# Patient Record
Sex: FEMALE | Race: WHITE | NOT HISPANIC OR LATINO | URBAN - METROPOLITAN AREA
[De-identification: names, ages, dates, MRNs, and addresses within clinical notes are randomized per-mention and may not be internally consistent; named-entity substitution may affect disease eponyms.]

---

## 2023-07-27 ENCOUNTER — INPATIENT (INPATIENT)
Facility: HOSPITAL | Age: 83
LOS: 18 days | Discharge: NURSING, REHAB OR LONG-TERM CARE | DRG: 812 | End: 2023-08-15
Attending: HOSPITALIST | Admitting: FAMILY MEDICINE
Payer: COMMERCIAL

## 2023-07-27 VITALS
WEIGHT: 149.91 LBS | HEART RATE: 77 BPM | TEMPERATURE: 98 F | RESPIRATION RATE: 18 BRPM | HEIGHT: 63 IN | OXYGEN SATURATION: 99 % | DIASTOLIC BLOOD PRESSURE: 96 MMHG | SYSTOLIC BLOOD PRESSURE: 155 MMHG

## 2023-07-27 DIAGNOSIS — M19.90 UNSPECIFIED OSTEOARTHRITIS, UNSPECIFIED SITE: ICD-10-CM

## 2023-07-27 DIAGNOSIS — Z59.00 HOMELESSNESS UNSPECIFIED: ICD-10-CM

## 2023-07-27 DIAGNOSIS — D64.9 ANEMIA, UNSPECIFIED: ICD-10-CM

## 2023-07-27 LAB
ABO RH CONFIRMATION: SIGNIFICANT CHANGE UP
ALBUMIN SERPL ELPH-MCNC: 4.1 G/DL — SIGNIFICANT CHANGE UP (ref 3.3–5.2)
ALP SERPL-CCNC: 81 U/L — SIGNIFICANT CHANGE UP (ref 40–120)
ALT FLD-CCNC: 14 U/L — SIGNIFICANT CHANGE UP
ANION GAP SERPL CALC-SCNC: 12 MMOL/L — SIGNIFICANT CHANGE UP (ref 5–17)
ANISOCYTOSIS BLD QL: SIGNIFICANT CHANGE UP
APTT BLD: 32.2 SEC — SIGNIFICANT CHANGE UP (ref 24.5–35.6)
AST SERPL-CCNC: 20 U/L — SIGNIFICANT CHANGE UP
BASOPHILS # BLD AUTO: 0.03 K/UL — SIGNIFICANT CHANGE UP (ref 0–0.2)
BASOPHILS NFR BLD AUTO: 0.9 % — SIGNIFICANT CHANGE UP (ref 0–2)
BILIRUB SERPL-MCNC: 0.5 MG/DL — SIGNIFICANT CHANGE UP (ref 0.4–2)
BLD GP AB SCN SERPL QL: SIGNIFICANT CHANGE UP
BUN SERPL-MCNC: 17.8 MG/DL — SIGNIFICANT CHANGE UP (ref 8–20)
CALCIUM SERPL-MCNC: 8.9 MG/DL — SIGNIFICANT CHANGE UP (ref 8.4–10.5)
CHLORIDE SERPL-SCNC: 103 MMOL/L — SIGNIFICANT CHANGE UP (ref 96–108)
CO2 SERPL-SCNC: 22 MMOL/L — SIGNIFICANT CHANGE UP (ref 22–29)
CREAT SERPL-MCNC: 0.6 MG/DL — SIGNIFICANT CHANGE UP (ref 0.5–1.3)
DACRYOCYTES BLD QL SMEAR: SLIGHT — SIGNIFICANT CHANGE UP
EGFR: 89 ML/MIN/1.73M2 — SIGNIFICANT CHANGE UP
EOSINOPHIL # BLD AUTO: 0 K/UL — SIGNIFICANT CHANGE UP (ref 0–0.5)
EOSINOPHIL NFR BLD AUTO: 0 % — SIGNIFICANT CHANGE UP (ref 0–6)
FERRITIN SERPL-MCNC: 19 NG/ML — SIGNIFICANT CHANGE UP (ref 13–330)
GIANT PLATELETS BLD QL SMEAR: PRESENT — SIGNIFICANT CHANGE UP
GLUCOSE SERPL-MCNC: 108 MG/DL — HIGH (ref 70–99)
HCT VFR BLD CALC: 25.2 % — LOW (ref 34.5–45)
HGB BLD-MCNC: 6.9 G/DL — CRITICAL LOW (ref 11.5–15.5)
HYPOCHROMIA BLD QL: SIGNIFICANT CHANGE UP
INR BLD: 1.12 RATIO — SIGNIFICANT CHANGE UP (ref 0.85–1.18)
IRON SATN MFR SERPL: 11 UG/DL — LOW (ref 37–145)
IRON SATN MFR SERPL: 2 % — LOW (ref 14–50)
LYMPHOCYTES # BLD AUTO: 0.67 K/UL — LOW (ref 1–3.3)
LYMPHOCYTES # BLD AUTO: 18.6 % — SIGNIFICANT CHANGE UP (ref 13–44)
MANUAL SMEAR VERIFICATION: SIGNIFICANT CHANGE UP
MCHC RBC-ENTMCNC: 18 PG — LOW (ref 27–34)
MCHC RBC-ENTMCNC: 27.4 GM/DL — LOW (ref 32–36)
MCV RBC AUTO: 65.6 FL — LOW (ref 80–100)
MICROCYTES BLD QL: SIGNIFICANT CHANGE UP
MONOCYTES # BLD AUTO: 0.64 K/UL — SIGNIFICANT CHANGE UP (ref 0–0.9)
MONOCYTES NFR BLD AUTO: 17.7 % — HIGH (ref 2–14)
NEUTROPHILS # BLD AUTO: 2.25 K/UL — SIGNIFICANT CHANGE UP (ref 1.8–7.4)
NEUTROPHILS NFR BLD AUTO: 62.8 % — SIGNIFICANT CHANGE UP (ref 43–77)
PLAT MORPH BLD: NORMAL — SIGNIFICANT CHANGE UP
PLATELET # BLD AUTO: 162 K/UL — SIGNIFICANT CHANGE UP (ref 150–400)
POIKILOCYTOSIS BLD QL AUTO: SLIGHT — SIGNIFICANT CHANGE UP
POLYCHROMASIA BLD QL SMEAR: SLIGHT — SIGNIFICANT CHANGE UP
POTASSIUM SERPL-MCNC: 4 MMOL/L — SIGNIFICANT CHANGE UP (ref 3.5–5.3)
POTASSIUM SERPL-SCNC: 4 MMOL/L — SIGNIFICANT CHANGE UP (ref 3.5–5.3)
PROT SERPL-MCNC: 6.9 G/DL — SIGNIFICANT CHANGE UP (ref 6.6–8.7)
PROTHROM AB SERPL-ACNC: 12.4 SEC — SIGNIFICANT CHANGE UP (ref 9.5–13)
RBC # BLD: 3.84 M/UL — SIGNIFICANT CHANGE UP (ref 3.8–5.2)
RBC # FLD: 19.6 % — HIGH (ref 10.3–14.5)
RBC BLD AUTO: ABNORMAL
RETICS #: 84.8 K/UL — SIGNIFICANT CHANGE UP (ref 25–125)
RETICS/RBC NFR: 2.2 % — SIGNIFICANT CHANGE UP (ref 0.5–2.5)
SARS-COV-2 RNA SPEC QL NAA+PROBE: SIGNIFICANT CHANGE UP
SCHISTOCYTES BLD QL AUTO: SLIGHT — SIGNIFICANT CHANGE UP
SMUDGE CELLS # BLD: PRESENT — SIGNIFICANT CHANGE UP
SODIUM SERPL-SCNC: 137 MMOL/L — SIGNIFICANT CHANGE UP (ref 135–145)
TIBC SERPL-MCNC: 493 UG/DL — HIGH (ref 220–430)
TRANSFERRIN SERPL-MCNC: 345 MG/DL — SIGNIFICANT CHANGE UP (ref 192–382)
WBC # BLD: 3.59 K/UL — LOW (ref 3.8–10.5)
WBC # FLD AUTO: 3.59 K/UL — LOW (ref 3.8–10.5)

## 2023-07-27 PROCEDURE — 99223 1ST HOSP IP/OBS HIGH 75: CPT

## 2023-07-27 PROCEDURE — 99285 EMERGENCY DEPT VISIT HI MDM: CPT

## 2023-07-27 PROCEDURE — 93010 ELECTROCARDIOGRAM REPORT: CPT

## 2023-07-27 RX ORDER — PANTOPRAZOLE SODIUM 20 MG/1
40 TABLET, DELAYED RELEASE ORAL ONCE
Refills: 0 | Status: COMPLETED | OUTPATIENT
Start: 2023-07-27 | End: 2023-07-27

## 2023-07-27 RX ORDER — ACETAMINOPHEN 500 MG
650 TABLET ORAL EVERY 6 HOURS
Refills: 0 | Status: DISCONTINUED | OUTPATIENT
Start: 2023-07-27 | End: 2023-08-15

## 2023-07-27 RX ORDER — PANTOPRAZOLE SODIUM 20 MG/1
40 TABLET, DELAYED RELEASE ORAL
Refills: 0 | Status: DISCONTINUED | OUTPATIENT
Start: 2023-07-27 | End: 2023-08-15

## 2023-07-27 RX ORDER — FUROSEMIDE 40 MG
20 TABLET ORAL ONCE
Refills: 0 | Status: COMPLETED | OUTPATIENT
Start: 2023-07-27 | End: 2023-07-27

## 2023-07-27 RX ADMIN — PANTOPRAZOLE SODIUM 40 MILLIGRAM(S): 20 TABLET, DELAYED RELEASE ORAL at 19:25

## 2023-07-27 SDOH — ECONOMIC STABILITY - HOUSING INSECURITY: HOMELESSNESS UNSPECIFIED: Z59.00

## 2023-07-27 NOTE — H&P ADULT - ASSESSMENT
pt. is an 83 y/o Female with history of arthritis, dementia  ? BIBEMS from department of  office for evaluation due to concerns for not being able to care for herself. Patient has been in shelter for the past 2 weeks due to homelessness after being evicted from her previous home with her son. Patient denies any complaints at this time. Denies fevers, chills, headache, chest pain, palpitations, shortness of breath, cough, abd. pain, nausea, vomiting, diarrhea, hematuria, vaginal bleed, dysuria, dark stools, bloody vomitus, focal neurologic symptoms.  In the ER pt's Hb 6.9, no old labs for comparison pt. is refusing rectal exam. ER team given one unit of

## 2023-07-27 NOTE — ED PROVIDER NOTE - CLINICAL SUMMARY MEDICAL DECISION MAKING FREE TEXT BOX
82y Female with homelessness and concerns for not being able to care for self. No signs of traumatic injuries, well appearing. Hemodynamically stable, lungs clear. 82y Female with homelessness and concerns for not being able to care for self. No signs of traumatic injuries, well appearing. Hemodynamically stable, lungs clear. Differential diagnosis includes but not limited to electrolyte derangement, anemia. SW following. 82y Female with homelessness and concerns for not being able to care for self. No signs of traumatic injuries, well appearing. Hemodynamically stable, lungs clear. Differential diagnosis includes but not limited to electrolyte derangement, anemia. SW following. Labs and imaging independently reviewed and interpreted with note of anemia, Protonix given.  Multiple attempts for occult with patient refusing. Telemetry admit.

## 2023-07-27 NOTE — PATIENT PROFILE ADULT - FALL HARM RISK - HARM RISK INTERVENTIONS

## 2023-07-27 NOTE — ED ADULT NURSE REASSESSMENT NOTE - NS ED NURSE REASSESS COMMENT FT1
Dr Hope at bedside. pt states she is refusing a blood transfusion at this time. pt states she wants to talk to her doctor that she has seen for many years and that she will talk to him tomorrow, so for tonight pt does not want a blood transfusion. pt is A&Ox2-3, she is able to state her name, birthday and that she is at a hospital but states "I don't know the name of it because I have never been here before and quite frankly I didn't know it existed." When asked what year it is the pt is unable to state the year, but knows it is summer time, that is July and that her birthday is on Saturday. pt is unable to give the name of the president, stating   "I can't pronounce his name but I know he is an idiot." per Dr Hope, he will consult psych in the morning but since the patient does not have any active bleeding at this time and the pt received protonix it is ok for pt not to have the blood transfusion tonight.

## 2023-07-27 NOTE — ED PROVIDER NOTE - ATTENDING CONTRIBUTION TO CARE
currently homeless, living in shelter, poor adherence to ADLs, sent for eval; hx of dementia; other medical history unknown; denies specific complaints; on exam, awake and alert, intermittently confused; head NCAT; normal mucosa; +pallor; normal heart and lung sounds; abd soft nt nd; no edema; decline rectal exam; anemia noted; plan for transfusion, admit;    I personally saw the patient with the resident, and completed the key components of the history and physical exam. I then discussed the management plan with the resident.

## 2023-07-27 NOTE — ED ADULT NURSE REASSESSMENT NOTE - NS ED NURSE REASSESS COMMENT FT1
pt refusing occult testing at this time. Rn spoke to diet office and teena states pt tray is coming to ED.

## 2023-07-27 NOTE — ED PROVIDER NOTE - OBJECTIVE STATEMENT
82y Female with history of arthritis, ?dementia BIBEMS from department of  office for evaluation due to concerns for not being able to care for herself. Patient has been in shelter for the past 2 weeks due to homelessness after being evicted from her previous home with her son. Patient denies any complaints at this time. Denies fevers, chills, headache, chest pain, palpitations, shortness of breath, cough, nausea, vomiting, diarrhea, hematuria, dysuria, dark stools, focal neurologic symptoms.

## 2023-07-27 NOTE — ED ADULT NURSE NOTE - OBJECTIVE STATEMENT
Assumed patient care at 17:00, no acute distress noted at this time, received patient a&ox4, respirations are even and unlabored on room air. Patient BIBA from shelter, pt states that she is hoping to be placed in an assisted living facility. Patient states she used to live at home with her adult son but lost their apartment due to financial troubles. Patient denies pain, SOB, dizziness, blood thinners. Pt states she has arthritis and uses her walker to get around. Patient updated on plan of care, awaiting social work consult. Labs drawn and sent to lab, patient awaiting food tray, safety precautions maintained.

## 2023-07-27 NOTE — H&P ADULT - PROBLEM SELECTOR PLAN 1
Hb 6.9, etiology ?   no old labs for comparison.   GI, PUD ? source. no info about If had colonoscopy in the past.   chronic anemia ? with drop in Hb  pt. refused rectal exam  will request for anemia work up  GI consult called by ER physician Hb 6.9, etiology ?   no old labs for comparison.   GI, PUD ? source. no info about If had colonoscopy in the past.   chronic anemia ? with drop in Hb  pt. refused rectal exam  ER physician ordered 1 unit of prbc but pt. is refusing to get blood, stated " I don't want it " tried to explain that 1 unit of blood will help her blood count but still does not want it. capacity ? with suspected dementia.   will request for anemia work up  GI consult called by ER physician

## 2023-07-27 NOTE — H&P ADULT - NSHPPHYSICALEXAM_GEN_ALL_CORE
Vital Signs Last 24 Hrs  T(C): 36.3 (27 Jul 2023 19:18), Max: 36.9 (27 Jul 2023 16:13)  T(F): 97.4 (27 Jul 2023 19:18), Max: 98.5 (27 Jul 2023 16:13)  HR: 85 (27 Jul 2023 19:18) (77 - 85)  BP: 143/73 (27 Jul 2023 19:18) (143/73 - 155/96)  BP(mean): --  RR: 18 (27 Jul 2023 16:13) (18 - 18)  SpO2: 98% (27 Jul 2023 19:18) (98% - 99%)    Parameters below as of 27 Jul 2023 16:13  Patient On (Oxygen Delivery Method): room air    General: pt. in bed not in distress  eyes : . PERRL. intact EOM.  HENT: AT, NC no throat erythema or exudate.   Neck: supple. no JVD.   Chest: air entry fair bilaterally  Heart: S1,S2. RRR. no heart murmur. trace edema of lower extremities.   Abdomen: soft. non-tender. non-distended. + BS.   rectal : deferred by pt.  Ext:  no calf tenderness. arthritic changes in multiple joints noted  vascular : distal pulses palpable  Neuro: Alert, awake, non focal, suspected baseline dementia, no speech disorder  Skin: warm and dry, mild skin pallor.  psych : no agitation, no si/hi

## 2023-07-27 NOTE — H&P ADULT - PROBLEM SELECTOR PLAN 3
social work consult, will need placement. social work consult, will need placement.  Psych consult requested if pt. has capacity . social work consult, will need placement.  Psych consult requested if pt. has capacity and also  will likely need psych eval for placement.

## 2023-07-27 NOTE — ED ADULT NURSE NOTE - NSFALLRISKINTERV_ED_ALL_ED

## 2023-07-27 NOTE — H&P ADULT - HISTORY OF PRESENT ILLNESS
pt. is an 83 y/o Female with history of arthritis, ?dementia BIBEMS from department of  office for evaluation due to concerns for not being able to care for herself. Patient has been in shelter for the past 2 weeks due to homelessness after being evicted from her previous home with her son. Patient denies any complaints at this time. Denies fevers, chills, headache, chest pain, palpitations, shortness of breath, cough, abd. pain, nausea, vomiting, diarrhea, hematuria, vaginal bleed, dysuria, dark stools, bloody vomitus, focal neurologic symptoms.  In the ER pt's Hb 6.9, no old labs for comparison pt. is refusing rectal exam. ER team given one unit of prbc.  pt. is an 83 y/o Female with history of arthritis, dementia  ? BIBEMS from department of  office for evaluation due to concerns for not being able to care for herself. Patient has been in shelter for the past 2 weeks due to homelessness after being evicted from her previous home with her son. Patient denies any complaints at this time. Denies fevers, chills, headache, chest pain, palpitations, shortness of breath, cough, abd. pain, nausea, vomiting, diarrhea, hematuria, vaginal bleed, dysuria, dark stools, bloody vomitus, focal neurologic symptoms.  In the ER pt's Hb 6.9, no old labs for comparison pt. is refusing rectal exam. ER team given one unit of prbc. pt. stated that she is not on any meds like motrin, advil or any other prescribed meds. i called pt's sons iglesia and luis e , numbers in our system. someone picked up from the number for iglesia but stated it was not his place. no answer from luis e phone.  pt. is an 83 y/o Female with history of arthritis, dementia  ? BIBEMS from department of  office for evaluation due to concerns for not being able to care for herself. Patient has been in shelter for the past 2 weeks due to homelessness after being evicted from her previous home with her son. Patient denies any complaints at this time. Denies fevers, chills, headache, chest pain, palpitations, shortness of breath, cough, abd. pain, nausea, vomiting, diarrhea, hematuria, vaginal bleed, dysuria, dark stools, bloody vomitus, focal neurologic symptoms. pt. stated that she is not sure if allergic to any medicines.   In the ER pt's Hb 6.9, no old labs for comparison pt. is refusing rectal exam. ER team given one unit of prbc. pt. stated that she is not on any meds like motrin, advil or any other prescribed meds. i called pt's sons iglesia and luis e , stiven in our system. someone picked up from the number for iglesia but stated it was not his place. no answer from luis e phone.

## 2023-07-27 NOTE — PATIENT PROFILE ADULT - VISION (WITH CORRECTIVE LENSES IF THE PATIENT USUALLY WEARS THEM):
Partially impaired: cannot see medication labels or newsprint, but can see obstacles in path, and the surrounding layout; can count fingers at arm's length [Negative] : Heme/Lymph [FreeTextEntry6] : COugh improved [de-identified] : HA improved

## 2023-07-27 NOTE — ED ADULT TRIAGE NOTE - CHIEF COMPLAINT QUOTE
Patient BIBA to ED today from department of  office for evaluation of placement into assistant living EMS states this for reason they were called.  Patient for about the past 2 weeks has been in shelter and is not taking care of herself for example washing herself and eating.  Patient with hx dementia according to EMS.  Patient was living with son seems for about two weeks ago and seems she was displaced from apartment due to son being hospitalized and now his whereabouts are unknown.  Patient is awake and alert, was able to provide information on herself, , year, states she is in a hospital, states she is here due to "people wanting her to take a break".

## 2023-07-28 LAB
ANION GAP SERPL CALC-SCNC: 9 MMOL/L — SIGNIFICANT CHANGE UP (ref 5–17)
BASOPHILS # BLD AUTO: 0.01 K/UL — SIGNIFICANT CHANGE UP (ref 0–0.2)
BASOPHILS NFR BLD AUTO: 0.2 % — SIGNIFICANT CHANGE UP (ref 0–2)
BUN SERPL-MCNC: 16.3 MG/DL — SIGNIFICANT CHANGE UP (ref 8–20)
CALCIUM SERPL-MCNC: 8.7 MG/DL — SIGNIFICANT CHANGE UP (ref 8.4–10.5)
CHLORIDE SERPL-SCNC: 105 MMOL/L — SIGNIFICANT CHANGE UP (ref 96–108)
CO2 SERPL-SCNC: 23 MMOL/L — SIGNIFICANT CHANGE UP (ref 22–29)
CREAT SERPL-MCNC: 0.66 MG/DL — SIGNIFICANT CHANGE UP (ref 0.5–1.3)
EGFR: 87 ML/MIN/1.73M2 — SIGNIFICANT CHANGE UP
EOSINOPHIL # BLD AUTO: 0.03 K/UL — SIGNIFICANT CHANGE UP (ref 0–0.5)
EOSINOPHIL NFR BLD AUTO: 0.7 % — SIGNIFICANT CHANGE UP (ref 0–6)
FOLATE SERPL-MCNC: 14.4 NG/ML — SIGNIFICANT CHANGE UP
GLUCOSE SERPL-MCNC: 89 MG/DL — SIGNIFICANT CHANGE UP (ref 70–99)
HCT VFR BLD CALC: 23 % — LOW (ref 34.5–45)
HGB BLD-MCNC: 6.3 G/DL — CRITICAL LOW (ref 11.5–15.5)
IMM GRANULOCYTES NFR BLD AUTO: 0.5 % — SIGNIFICANT CHANGE UP (ref 0–0.9)
LYMPHOCYTES # BLD AUTO: 0.88 K/UL — LOW (ref 1–3.3)
LYMPHOCYTES # BLD AUTO: 21.4 % — SIGNIFICANT CHANGE UP (ref 13–44)
MAGNESIUM SERPL-MCNC: 1.9 MG/DL — SIGNIFICANT CHANGE UP (ref 1.8–2.6)
MCHC RBC-ENTMCNC: 18.1 PG — LOW (ref 27–34)
MCHC RBC-ENTMCNC: 27.4 GM/DL — LOW (ref 32–36)
MCV RBC AUTO: 66.1 FL — LOW (ref 80–100)
MONOCYTES # BLD AUTO: 0.69 K/UL — SIGNIFICANT CHANGE UP (ref 0–0.9)
MONOCYTES NFR BLD AUTO: 16.7 % — HIGH (ref 2–14)
NEUTROPHILS # BLD AUTO: 2.49 K/UL — SIGNIFICANT CHANGE UP (ref 1.8–7.4)
NEUTROPHILS NFR BLD AUTO: 60.5 % — SIGNIFICANT CHANGE UP (ref 43–77)
PHOSPHATE SERPL-MCNC: 3.2 MG/DL — SIGNIFICANT CHANGE UP (ref 2.4–4.7)
PLATELET # BLD AUTO: 157 K/UL — SIGNIFICANT CHANGE UP (ref 150–400)
POTASSIUM SERPL-MCNC: 4.1 MMOL/L — SIGNIFICANT CHANGE UP (ref 3.5–5.3)
POTASSIUM SERPL-SCNC: 4.1 MMOL/L — SIGNIFICANT CHANGE UP (ref 3.5–5.3)
RBC # BLD: 3.48 M/UL — LOW (ref 3.8–5.2)
RBC # FLD: 19.6 % — HIGH (ref 10.3–14.5)
SODIUM SERPL-SCNC: 137 MMOL/L — SIGNIFICANT CHANGE UP (ref 135–145)
T3 SERPL-MCNC: 92 NG/DL — SIGNIFICANT CHANGE UP (ref 80–200)
T4 AB SER-ACNC: 5.4 UG/DL — SIGNIFICANT CHANGE UP (ref 4.5–12)
TSH SERPL-MCNC: 5.34 UIU/ML — HIGH (ref 0.27–4.2)
VIT B12 SERPL-MCNC: 530 PG/ML — SIGNIFICANT CHANGE UP (ref 232–1245)
WBC # BLD: 4.12 K/UL — SIGNIFICANT CHANGE UP (ref 3.8–10.5)
WBC # FLD AUTO: 4.12 K/UL — SIGNIFICANT CHANGE UP (ref 3.8–10.5)

## 2023-07-28 PROCEDURE — 71045 X-RAY EXAM CHEST 1 VIEW: CPT | Mod: 26

## 2023-07-28 PROCEDURE — 99233 SBSQ HOSP IP/OBS HIGH 50: CPT

## 2023-07-28 PROCEDURE — 99221 1ST HOSP IP/OBS SF/LOW 40: CPT

## 2023-07-28 PROCEDURE — 99223 1ST HOSP IP/OBS HIGH 75: CPT

## 2023-07-28 RX ORDER — ALPRAZOLAM 0.25 MG
1 TABLET ORAL ONCE
Refills: 0 | Status: DISCONTINUED | OUTPATIENT
Start: 2023-07-28 | End: 2023-07-28

## 2023-07-28 NOTE — BH CONSULTATION LIAISON ASSESSMENT NOTE - SUMMARY
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 While in the hospital, patient found with significant anemia requiring transfusion but currently refusing. psychiatry consulted to evaluate patients capacity to refuse.     Patient seen and evaluated and found to be a poor historian with poor attention and memory. No known formal diagnosis of dementia but history of forgetfulness as per son (mild cognitive impairment?). In regards to patients capacity, patient is able to express a choice but currently lacks appreciation, and reasoning related to her current medical condition(anemia) which limits her capacity to refuse blood transfusion     Dx.   Delirium  r/o dementia     Recs   -Maintain delirium precautions   -Avoid anticholinergic agents, benzos, opioid as they can further perpetuate confusion   -Frequent re orientation, Hydration, try to avoid restraints and if possible, mobilize patient and PT involvement   -no capacity to refuse blood transfusion, recommend to include family in care.

## 2023-07-28 NOTE — BH CONSULTATION LIAISON ASSESSMENT NOTE - HPI (INCLUDE ILLNESS QUALITY, SEVERITY, DURATION, TIMING, CONTEXT, MODIFYING FACTORS, ASSOCIATED SIGNS AND SYMPTOMS)
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 While in the hospital, patient found with significant anemia requiring transfusion but currently refusing. psychiatry consulted to evaluate patients capacity to refuse.     Patient seen and evaluated and found to be calm, cooperative and pleasant but a poor historian while oriented to person, partially to place, partially to time and not to situation. Patient stating she was told to come to "this place" stating she heard we "can help" but could not elaborate. Patient does admit to being seen by a doctor this morning and at first states she was told that "everything is fine" but when probed, she admits she was told she had "anemia". Patient stating she has always had anemia and used to get "shots as a kid for it". Patient cued by writer on need for transfusion and patient declining stating "I just don't want any surgeries now". Writer attempted to clarify but patient declining stating she will be "fine". Patient does report to having some memory problems but denies any history of dementia. she states she was living with her son Reji who lost his job and as a result the lost there apartment. she state she has been staying in different shelters/ hotels but could not elaborate and could not related where her son Reji is now or her other 2 sons (has 3 adult sons total). She denies any depression or anxiety, reports good appetite and sleep, with no s/h ideation, no symptoms of ho or AVH.     Discussed with SW who spoke to patients one Matthew who is living in a shelter himself and denies patient having a diagnoses of dementia but can be "forgetful".     Writer attempted to contact patients sons for collateral but no answer     [place] [phone]  [place]ong [phone]  Reji [phone]

## 2023-07-28 NOTE — PROVIDER CONTACT NOTE (CRITICAL VALUE NOTIFICATION) - ACTION/TREATMENT ORDERED:
Tranfuse, however pt is refusing. ROCCO Gruber will attempt to speak with pt in regards to pt needing transfusion.

## 2023-07-28 NOTE — BH CONSULTATION LIAISON ASSESSMENT NOTE - NSBHCHARTREVIEWVS_PSY_A_CORE FT
Vital Signs Last 24 Hrs  T(C): 36.5 (28 Jul 2023 11:13), Max: 36.9 (27 Jul 2023 16:13)  T(F): 97.7 (28 Jul 2023 11:13), Max: 98.5 (27 Jul 2023 16:13)  HR: 77 (28 Jul 2023 11:13) (74 - 85)  BP: 132/80 (28 Jul 2023 11:13) (132/80 - 159/83)  BP(mean): --  RR: 18 (28 Jul 2023 11:13) (16 - 18)  SpO2: 99% (28 Jul 2023 11:13) (96% - 100%)    Parameters below as of 28 Jul 2023 11:13  Patient On (Oxygen Delivery Method): room air

## 2023-07-28 NOTE — BH CONSULTATION LIAISON ASSESSMENT NOTE - CURRENT MEDICATION
MEDICATIONS  (STANDING):  pantoprazole  Injectable 40 milliGRAM(s) IV Push two times a day    MEDICATIONS  (PRN):  acetaminophen     Tablet .. 650 milliGRAM(s) Oral every 6 hours PRN Mild Pain (1 - 3), Moderate Pain (4 - 6)

## 2023-07-28 NOTE — PROGRESS NOTE ADULT - SUBJECTIVE AND OBJECTIVE BOX
[facility] Division of Hospital Medicine    Chief Complaint:  Anemia    SUBJECTIVE / OVERNIGHT EVENTS: patient seen and examined at bedside. She seems pleasant. overall poor historian. Does not seem to understand insight into her situation. wants to see outpatient doctor. I spoke to her extensively about her condition and what may happen to her. she doesn't have a full grasp of her conditions and it's consequences. She is refusing blood and blood work.  Patient denies chest pain, SOB, abd pain, N/V, fever, chills, dysuria or any other complaints. All remainder ROS negative.     MEDICATIONS  (STANDING):  pantoprazole  Injectable 40 milliGRAM(s) IV Push two times a day    MEDICATIONS  (PRN):  acetaminophen     Tablet .. 650 milliGRAM(s) Oral every 6 hours PRN Mild Pain (1 - 3), Moderate Pain (4 - 6)        I&O's Summary      PHYSICAL EXAM:  Vital Signs Last 24 Hrs  T(C): 36.5 (28 Jul 2023 11:13), Max: 36.9 (27 Jul 2023 16:13)  T(F): 97.7 (28 Jul 2023 11:13), Max: 98.5 (27 Jul 2023 16:13)  HR: 77 (28 Jul 2023 11:13) (74 - 85)  BP: 132/80 (28 Jul 2023 11:13) (132/80 - 159/83)  BP(mean): --  RR: 18 (28 Jul 2023 11:13) (16 - 18)  SpO2: 99% (28 Jul 2023 11:13) (96% - 100%)    Parameters below as of 28 Jul 2023 11:13  Patient On (Oxygen Delivery Method): room air            CONSTITUTIONAL: NAD, Pleasant  RESPIRATORY: Normal respiratory effort; lungs are clear to auscultation bilaterally  CARDIOVASCULAR: Regular rate and rhythm, normal S1 and S2, no murmur/rub/gallop; No lower extremity edema; Peripheral pulses are 2+ bilaterally  ABDOMEN: Nontender to palpation, normoactive bowel sounds, no rebound/guarding; No hepatosplenomegaly  MUSCLOSKELETAL:  Normal gait; no clubbing or cyanosis of digits; no joint swelling or tenderness to palpation  PSYCH: A+O to name, doesn't know place or date.  NEUROLOGY: CN 2-12 are intact and symmetric; no gross sensory deficits;   SKIN: No rashes; no palpable lesions    LABS:                        6.3    4.12  )-----------( 157      ( 28 Jul 2023 02:24 )             23.0     07-28    137  |  105  |  16.3  ----------------------------<  89  4.1   |  23.0  |  0.66    Ca    8.7      28 Jul 2023 02:24  Phos  3.2     07-28  Mg     1.9     07-28    TPro  6.9  /  Alb  4.1  /  TBili  0.5  /  DBili  x   /  AST  20  /  ALT  14  /  AlkPhos  81  07-27    PT/INR - ( 27 Jul 2023 17:52 )   PT: 12.4 sec;   INR: 1.12 ratio         PTT - ( 27 Jul 2023 17:52 )  PTT:32.2 sec      Urinalysis Basic - ( 28 Jul 2023 02:24 )    Color: x / Appearance: x / SG: x / pH: x  Gluc: 89 mg/dL / Ketone: x  / Bili: x / Urobili: x   Blood: x / Protein: x / Nitrite: x   Leuk Esterase: x / RBC: x / WBC x   Sq Epi: x / Non Sq Epi: x / Bacteria: x        CAPILLARY BLOOD GLUCOSE            RADIOLOGY & ADDITIONAL TESTS:  Results Reviewed    Imaging Personally Reviewed    Electrocardiogram Personally Reviewed

## 2023-07-28 NOTE — CONSULT NOTE ADULT - NS ATTEND AMEND GEN_ALL_CORE FT
Patient seen and examined with ACP   Appears in no distress. No complaints, of abdominal pain, nausea, vomiting , weight loss, rectal bleeding or melena.   Patient found to have Fe def microcytic anemia   absolutely refused physical exam , refused blood transfusion. When asked, she will not give a reason for the refusal of either  Wants to go and be treated by her own MD         A/P  microcytic anemia. chronic . NO acute bleeding. BUN normal   I explained to pt that anemia, especially in elderly can lead to cardiac strain, MI, death. I explained that anemia can be a sign of cancer. Despite this, pt refused transfusion and egd,colonoscopy. Does not want non invasive workup as well- virtual colon. Just wants to be Discharged  needs FE transfusion  Hematology consult    Will sign off for now  Pt should be evaluated by psych, assessed for capacity  Will sign off for now, please call back pt agrees to colonoscopy/egd. Pt must be willing to prep for colonoscopy  I spoke to nurse at bedside

## 2023-07-28 NOTE — BH CONSULTATION LIAISON ASSESSMENT NOTE - NSBHCHARTREVIEWLAB_PSY_A_CORE FT
Basic Metabolic Panel in AM (07.28.23 @ 02:24)    Sodium: 137 mmol/L    Potassium: 4.1 mmol/L    Chloride: 105: Chloride reference range changed from ..10/26/2022  . mmol/L    Carbon Dioxide: 23.0 mmol/L    Anion Gap: 9 mmol/L    Blood Urea Nitrogen: 16.3 mg/dL    Creatinine: 0.66 mg/dL    Glucose: 89 mg/dL    Calcium: 8.7 mg/dL    eGFR: 87: The estimated glomerular filtration rate (eGFR) is calculated using the  2021 CKD-EPI creatinine equation, which does not have a coefficient for  race and is validated in individuals 18 years of age and older (N Engl J  Med 2021; 385:0861-8839). Creatinine-based eGFR may be inaccurate in  various situations including but not limited to extremes of muscle mass,  altered dietary protein intake, or medications that affect renal tubular  creatinine secretion. mL/min/1.73m2

## 2023-07-28 NOTE — CONSULT NOTE ADULT - SUBJECTIVE AND OBJECTIVE BOX
HISTORY OF PRESENT ILLNESS: 82y Female with history of arthritis, ?dementia BIBEMS from department of  office for evaluation due to concerns for not being able to care for herself. Patient has been in shelter for the past 2 weeks due to homelessness after being evicted from her previous home with her son. GI consulted for microcytic anemia, hemoglobin 6.9 with MCV 66. Patient refused to have blood transfusion and her repeat Hgb was 6.3 this morning. At the time of my examination patient is sitting in chair, in no acute distress. She continue to refuse rectal examination and physical exam. She stated " I am anemic since childhood and I don't want any other doctors in my care other than my own doctors". She follows with gastroenterology "Dr. Cobb across the street". Patient denies chest pain, palpitation, shortness of breath, dizziness, lightheadedness, melena, hematochezia, hematemesis, abdominal pain, diarrhea, constipation. Denies heart burn, acid reflux, family history of colon cancer. Her last EGD/was "many years ago". No history of GI bleed in the past. Denies use of alcohol or NSAIDs.        ROS: A 14-point review of systems was completed and was otherwise negative save what was reported in the HPI.    PAST MEDICAL/SURGICAL HISTORY:  Arthritis    No significant past surgical history      SOCIAL HISTORY:  - TOBACCO: Denies  - ALCOHOL: Denies  - ILLICIT DRUG USE: Denies    FAMILY HISTORY:  No known history of gastrointestinal or liver disease;  No pertinent family history in first degree relatives        HOME MEDICATIONS:    INPATIENT MEDICATIONS:  MEDICATIONS  (STANDING):  pantoprazole  Injectable 40 milliGRAM(s) IV Push two times a day    MEDICATIONS  (PRN):  acetaminophen     Tablet .. 650 milliGRAM(s) Oral every 6 hours PRN Mild Pain (1 - 3), Moderate Pain (4 - 6)    ALLERGIES:  Allergy Status Unknown    T(C): 36.8 (07-28-23 @ 07:28), Max: 36.9 (07-27-23 @ 16:13)  HR: 74 (07-28-23 @ 07:28) (74 - 85)  BP: 159/83 (07-28-23 @ 07:28) (140/83 - 159/83)  RR: 18 (07-28-23 @ 07:28) (16 - 18)  SpO2: 100% (07-28-23 @ 07:28) (96% - 100%)      PHYSICAL EXAM:    Patient refused:  Constitutional: In no acute distress.   Neuro: Awake, alert       LABS:             6.3    4.12  )-----------( 157      ( 07-28 @ 02:24 )             23.0                6.9    3.59  )-----------( 162      ( 07-27 @ 17:09 )             25.2       PT/INR - ( 27 Jul 2023 17:52 )   PT: 12.4 sec;   INR: 1.12 ratio         PTT - ( 27 Jul 2023 17:52 )  PTT:32.2 sec  07-28    137  |  105  |  16.3  ----------------------------<  89  4.1   |  23.0  |  0.66    Ca    8.7      28 Jul 2023 02:24  Phos  3.2     07-28  Mg     1.9     07-28    TPro  6.9  /  Alb  4.1  /  TBili  0.5  /  DBili  x   /  AST  20  /  ALT  14  /  AlkPhos  81  07-27    LIVER FUNCTIONS - ( 27 Jul 2023 17:09 )  Alb: 4.1 g/dL / Pro: 6.9 g/dL / ALK PHOS: 81 U/L / ALT: 14 U/L / AST: 20 U/L / GGT: x               % Saturation, Iron: 2 % *L* (07-27-23 @ 17:09)  Iron Total: 11 ug/dL *L* (07-27-23 @ 17:09)  Iron - Total Binding Capacity.: 493 ug/dL *H* (07-27-23 @ 17:09)  Ferritin: 19 ng/mL (07-27-23 @ 17:09)    Urinalysis Basic - ( 28 Jul 2023 02:24 )    Color: x / Appearance: x / SG: x / pH: x  Gluc: 89 mg/dL / Ketone: x  / Bili: x / Urobili: x   Blood: x / Protein: x / Nitrite: x   Leuk Esterase: x / RBC: x / WBC x   Sq Epi: x / Non Sq Epi: x / Bacteria: x       HISTORY OF PRESENT ILLNESS: 82y Female with history of arthritis, ?dementia BIBEMS from department of  office for evaluation due to concerns for not being able to care for herself. Patient has been in shelter for the past 2 weeks due to homelessness after being evicted from her previous home with her son. GI consulted for microcytic anemia, hemoglobin 6.9 with MCV 66. Patient refused to have blood transfusion and her repeat Hgb was 6.3 this morning.  NO overt GI bleeding noted by staff.   At the time of my history      patient is sitting in chair, in no acute distress. She continue to refuse rectal examination and physical exam. She stated " I am anemic since childhood and I don't want any other doctors in my care other than my own doctors". She follows with Dr Stuart ( I think this is her primary MD ) . Patient denies chest pain, palpitation, shortness of breath, dizziness, lightheadedness, melena, hematochezia, hematemesis, abdominal pain, diarrhea, constipation. Denies heart burn, acid reflux, family history of colon cancer. Nor sure if she has ever had EGD/colonoscopy. .No history of GI bleed in the past. Denies use of alcohol or NSAIDs.        ROS: A 14-point review of systems was completed and was otherwise negative save what was reported in the HPI.    PAST MEDICAL/SURGICAL HISTORY:  Arthritis    No significant past surgical history      SOCIAL HISTORY:  - TOBACCO: Denies  - ALCOHOL: Denies  - ILLICIT DRUG USE: Denies    FAMILY HISTORY:  No known history of gastrointestinal or liver disease;  No pertinent family history in first degree relatives        HOME MEDICATIONS:    INPATIENT MEDICATIONS:  MEDICATIONS  (STANDING):  pantoprazole  Injectable 40 milliGRAM(s) IV Push two times a day    MEDICATIONS  (PRN):  acetaminophen     Tablet .. 650 milliGRAM(s) Oral every 6 hours PRN Mild Pain (1 - 3), Moderate Pain (4 - 6)    ALLERGIES:  Allergy Status Unknown    T(C): 36.8 (07-28-23 @ 07:28), Max: 36.9 (07-27-23 @ 16:13)  HR: 74 (07-28-23 @ 07:28) (74 - 85)  BP: 159/83 (07-28-23 @ 07:28) (140/83 - 159/83)  RR: 18 (07-28-23 @ 07:28) (16 - 18)  SpO2: 100% (07-28-23 @ 07:28) (96% - 100%)      PHYSICAL EXAM:    Patient refused:  Constitutional: In no acute distress.   Neuro: Awake, alert       LABS:             6.3    4.12  )-----------( 157      ( 07-28 @ 02:24 )             23.0                6.9    3.59  )-----------( 162      ( 07-27 @ 17:09 )             25.2       PT/INR - ( 27 Jul 2023 17:52 )   PT: 12.4 sec;   INR: 1.12 ratio         PTT - ( 27 Jul 2023 17:52 )  PTT:32.2 sec  07-28    137  |  105  |  16.3  ----------------------------<  89  4.1   |  23.0  |  0.66    Ca    8.7      28 Jul 2023 02:24  Phos  3.2     07-28  Mg     1.9     07-28    TPro  6.9  /  Alb  4.1  /  TBili  0.5  /  DBili  x   /  AST  20  /  ALT  14  /  AlkPhos  81  07-27    LIVER FUNCTIONS - ( 27 Jul 2023 17:09 )  Alb: 4.1 g/dL / Pro: 6.9 g/dL / ALK PHOS: 81 U/L / ALT: 14 U/L / AST: 20 U/L / GGT: x               % Saturation, Iron: 2 % *L* (07-27-23 @ 17:09)  Iron Total: 11 ug/dL *L* (07-27-23 @ 17:09)  Iron - Total Binding Capacity.: 493 ug/dL *H* (07-27-23 @ 17:09)  Ferritin: 19 ng/mL (07-27-23 @ 17:09)    Urinalysis Basic - ( 28 Jul 2023 02:24 )    Color: x / Appearance: x / SG: x / pH: x  Gluc: 89 mg/dL / Ketone: x  / Bili: x / Urobili: x   Blood: x / Protein: x / Nitrite: x   Leuk Esterase: x / RBC: x / WBC x   Sq Epi: x / Non Sq Epi: x / Bacteria: x

## 2023-07-28 NOTE — PROGRESS NOTE ADULT - ASSESSMENT
gerardo. is an 81 y/o Female with history of arthritis, dementia  ? BIBEMS from department of  office for evaluation due to concerns for not being able to care for herself. Patient has been in shelter for the past 2 weeks due to homelessness after being evicted from her previous home with her son. Admitted for anemia with suspected GI bleed      Anemia r/o GI Bleed, Iron deficient Anemia   Hb 6.9-->6.3  no old labs for comparison.   GI, PUD ? source. no info about If had colonoscopy in the past.   pt. refused rectal exam  GI Consult- refused workup  Psych Consult- note reviewed. Limited capacity. May not refuse blood transfusion due to life threatening anemia  Will transfuse 1 unit and monitor to keep Hgb>7    ?Dementia r/o Delirium  - Delirium precautions  - obtain b12, folate, rpr,   - Psych consult appreciated  - Called family (sons) however no answer.   - Avoid anticholinergic drugs, benzos, opiod    Arthritis    arthritic changes in multiple joints noted, will keep on prn tylenol for pain.    Homeless  social work consult, will need placement.    DVT: SCDs  Diet: Regular gerardo. is an 81 y/o Female with history of arthritis, dementia  ? BIBEMS from department of  office for evaluation due to concerns for not being able to care for herself. Patient has been in shelter for the past 2 weeks due to homelessness after being evicted from her previous home with her son. Admitted for anemia with suspected GI bleed      Anemia r/o GI Bleed, Iron deficient Anemia   Hb 6.9-->6.3  no old labs for comparison.   GI, PUD ? source. no info about If had colonoscopy in the past.   pt. refused rectal exam  GI Consult- refused workup  Psych Consult- note reviewed. Limited capacity. May not refuse blood transfusion due to life threatening anemia  Attempted to reach out to sons on file multiple times however unreachable. due to life threatening nature of condition and current mental incapacity of patient to understand her risks, the benefit of blood transfusion outweigh risks.  Will transfuse 1 unit and monitor to keep Hgb>7    ?Dementia r/o Delirium  - Delirium precautions  - obtain b12, folate, rpr,   - Psych consult appreciated  - Called family (sons) however no answer.   - Avoid anticholinergic drugs, benzos, opiod    Arthritis    arthritic changes in multiple joints noted, will keep on prn tylenol for pain.    Homeless  social work consult, will need placement.    DVT: SCDs  Diet: Regular

## 2023-07-29 LAB
ANION GAP SERPL CALC-SCNC: 12 MMOL/L — SIGNIFICANT CHANGE UP (ref 5–17)
BLD GP AB SCN SERPL QL: SIGNIFICANT CHANGE UP
BUN SERPL-MCNC: 16.8 MG/DL — SIGNIFICANT CHANGE UP (ref 8–20)
CALCIUM SERPL-MCNC: 8.6 MG/DL — SIGNIFICANT CHANGE UP (ref 8.4–10.5)
CHLORIDE SERPL-SCNC: 106 MMOL/L — SIGNIFICANT CHANGE UP (ref 96–108)
CO2 SERPL-SCNC: 21 MMOL/L — LOW (ref 22–29)
CREAT SERPL-MCNC: 0.69 MG/DL — SIGNIFICANT CHANGE UP (ref 0.5–1.3)
EGFR: 86 ML/MIN/1.73M2 — SIGNIFICANT CHANGE UP
GLUCOSE SERPL-MCNC: 96 MG/DL — SIGNIFICANT CHANGE UP (ref 70–99)
HCT VFR BLD CALC: 27.5 % — LOW (ref 34.5–45)
HGB BLD-MCNC: 7.8 G/DL — LOW (ref 11.5–15.5)
MCHC RBC-ENTMCNC: 19.5 PG — LOW (ref 27–34)
MCHC RBC-ENTMCNC: 28.4 GM/DL — LOW (ref 32–36)
MCV RBC AUTO: 68.6 FL — LOW (ref 80–100)
OB PNL STL: NEGATIVE — SIGNIFICANT CHANGE UP
PLATELET # BLD AUTO: 149 K/UL — LOW (ref 150–400)
POTASSIUM SERPL-MCNC: 4.7 MMOL/L — SIGNIFICANT CHANGE UP (ref 3.5–5.3)
POTASSIUM SERPL-SCNC: 4.7 MMOL/L — SIGNIFICANT CHANGE UP (ref 3.5–5.3)
RBC # BLD: 4.01 M/UL — SIGNIFICANT CHANGE UP (ref 3.8–5.2)
RBC # FLD: 21.3 % — HIGH (ref 10.3–14.5)
SODIUM SERPL-SCNC: 139 MMOL/L — SIGNIFICANT CHANGE UP (ref 135–145)
WBC # BLD: 4.66 K/UL — SIGNIFICANT CHANGE UP (ref 3.8–10.5)
WBC # FLD AUTO: 4.66 K/UL — SIGNIFICANT CHANGE UP (ref 3.8–10.5)

## 2023-07-29 PROCEDURE — 99233 SBSQ HOSP IP/OBS HIGH 50: CPT

## 2023-07-29 RX ADMIN — PANTOPRAZOLE SODIUM 40 MILLIGRAM(S): 20 TABLET, DELAYED RELEASE ORAL at 18:05

## 2023-07-29 RX ADMIN — PANTOPRAZOLE SODIUM 40 MILLIGRAM(S): 20 TABLET, DELAYED RELEASE ORAL at 05:51

## 2023-07-29 NOTE — PROGRESS NOTE ADULT - ASSESSMENT
Assessment:  83 y/o Female with history of arthritis, dementia  ? BIBEMS from department of  office for evaluation due to concerns for not being able to care for herself. Patient has been in shelter for the past 2 weeks due to homelessness after being evicted from her previous home with her son. Admitted for anemia with suspected GI bleed      1. Anemia r/o GI Bleed, Iron deficient Anemia  - Hb 6.9-->6.3 now s/p 1 U PRBC improved to 7.8  no old labs for comparison.   GI, PUD ? source. no info about If had colonoscopy in the past, states she had one decades ago, unclear.   pt. refused rectal exam  GI Consult- refused workup, signed off until patient willing to undergo prep and testing   Psych Consult- note reviewed. Limited capacity. May not refuse blood transfusion due to life threatening anemia, however may refuse all non emergent procedures or interventions ([place] law)   Attempted to reach out to sons on file multiple times however unreachable.   monitor to keep Hgb>7, and will transfuse again if needed, however will not transfuse iron / reconsult GI without patient permission at this time    2. ?Dementia r/o Delirium  - Delirium precautions  - obtain b12, folate, rpr,   - Psych consult appreciated  - Called family (sons) however no answer.   - Avoid anticholinergic drugs, benzos, opiod    3. Arthritis    arthritic changes in multiple joints noted, will keep on prn tylenol for pain.    4. Homeless  social work consult, will need placement.    DVT: SCDs  Diet: Regular

## 2023-07-29 NOTE — PROGRESS NOTE ADULT - NS ATTEST RISK PROBLEM GEN_ALL_CORE FT
iron def anemia, likely GIB, with patient refusal of services. Mood disorder unspecified, no capacity to refuse transfusions, psych discussions on placement vs. medical ethics if needed.

## 2023-07-30 LAB
HCT VFR BLD CALC: 25.9 % — LOW (ref 34.5–45)
HGB BLD-MCNC: 7.4 G/DL — LOW (ref 11.5–15.5)
MCHC RBC-ENTMCNC: 19.2 PG — LOW (ref 27–34)
MCHC RBC-ENTMCNC: 28.6 GM/DL — LOW (ref 32–36)
MCV RBC AUTO: 67.1 FL — LOW (ref 80–100)
PLATELET # BLD AUTO: 151 K/UL — SIGNIFICANT CHANGE UP (ref 150–400)
RBC # BLD: 3.86 M/UL — SIGNIFICANT CHANGE UP (ref 3.8–5.2)
RBC # FLD: 20.2 % — HIGH (ref 10.3–14.5)
WBC # BLD: 4.11 K/UL — SIGNIFICANT CHANGE UP (ref 3.8–10.5)
WBC # FLD AUTO: 4.11 K/UL — SIGNIFICANT CHANGE UP (ref 3.8–10.5)

## 2023-07-30 PROCEDURE — 99233 SBSQ HOSP IP/OBS HIGH 50: CPT

## 2023-07-30 RX ORDER — IRON SUCROSE 20 MG/ML
300 INJECTION, SOLUTION INTRAVENOUS ONCE
Refills: 0 | Status: COMPLETED | OUTPATIENT
Start: 2023-07-30 | End: 2023-07-30

## 2023-07-30 RX ORDER — FERROUS SULFATE 325(65) MG
325 TABLET ORAL DAILY
Refills: 0 | Status: DISCONTINUED | OUTPATIENT
Start: 2023-07-30 | End: 2023-08-15

## 2023-07-30 RX ADMIN — PANTOPRAZOLE SODIUM 40 MILLIGRAM(S): 20 TABLET, DELAYED RELEASE ORAL at 18:21

## 2023-07-30 RX ADMIN — Medication 325 MILLIGRAM(S): at 18:21

## 2023-07-30 RX ADMIN — IRON SUCROSE 66.25 MILLIGRAM(S): 20 INJECTION, SOLUTION INTRAVENOUS at 16:13

## 2023-07-30 RX ADMIN — PANTOPRAZOLE SODIUM 40 MILLIGRAM(S): 20 TABLET, DELAYED RELEASE ORAL at 05:04

## 2023-07-30 NOTE — PROGRESS NOTE ADULT - SUBJECTIVE AND OBJECTIVE BOX
[facility] Division of Hospital Medicine    Chief Complaint:      SUBJECTIVE / OVERNIGHT EVENTS:    patient seen and examined at bedside, Son Matthew available today and came to visit patient from homeless shelter. Patient upset and would like to be placed in a shelter with him, she does not understand that the process would be difficult and our facility can only assist her with placement at this time. With family help patient became agreeable, now amenable to iron infusion as well, continues to refuse colonoscopy. Will work on placement to shelter in AM.      MEDICATIONS  (STANDING):  ferrous    sulfate 325 milliGRAM(s) Oral daily  pantoprazole  Injectable 40 milliGRAM(s) IV Push two times a day    MEDICATIONS  (PRN):  acetaminophen     Tablet .. 650 milliGRAM(s) Oral every 6 hours PRN Mild Pain (1 - 3), Moderate Pain (4 - 6)        I&O's Summary      PHYSICAL EXAM:  Vital Signs Last 24 Hrs  T(C): 36.7 (30 Jul 2023 16:52), Max: 36.9 (30 Jul 2023 08:15)  T(F): 98 (30 Jul 2023 16:52), Max: 98.4 (30 Jul 2023 08:15)  HR: 75 (30 Jul 2023 16:52) (75 - 86)  BP: 81/46 (30 Jul 2023 16:52) (81/46 - 169/91)  BP(mean): 58 (30 Jul 2023 16:52) (58 - 58)  RR: 18 (30 Jul 2023 16:52) (18 - 18)  SpO2: 100% (30 Jul 2023 16:52) (95% - 100%)    Parameters below as of 30 Jul 2023 16:52  Patient On (Oxygen Delivery Method): room air      CONSTITUTIONAL: NAD, Pleasant  ENT: poor oral dentition, many missing teeth   RESPIRATORY: Normal respiratory effort; lungs are clear to auscultation bilaterally  CARDIOVASCULAR: Regular rate and rhythm, normal S1 and S2, no murmur/rub/gallop; No lower extremity edema; Peripheral pulses are 2+ bilaterally  ABDOMEN: Nontender to palpation, normoactive bowel sounds, no rebound/guarding; No hepatosplenomegaly  MUSCLOSKELETAL:  Normal gait with assist device; no clubbing or cyanosis of digits; no joint swelling or tenderness to palpation  PSYCH: A+O to name, partially to place and date.  NEUROLOGY: CN 2-12 are intact and symmetric; no gross sensory deficits;   SKIN: No rashes; no palpable lesions    LABS:                        7.4    4.11  )-----------( 151      ( 30 Jul 2023 02:43 )             25.9     07-29    139  |  106  |  16.8  ----------------------------<  96  4.7   |  21.0<L>  |  0.69    Ca    8.6      29 Jul 2023 00:54            Urinalysis Basic - ( 29 Jul 2023 00:54 )    Color: x / Appearance: x / SG: x / pH: x  Gluc: 96 mg/dL / Ketone: x  / Bili: x / Urobili: x   Blood: x / Protein: x / Nitrite: x   Leuk Esterase: x / RBC: x / WBC x   Sq Epi: x / Non Sq Epi: x / Bacteria: x        CAPILLARY BLOOD GLUCOSE            RADIOLOGY & ADDITIONAL TESTS:  Results Reviewed:   Imaging Personally Reviewed:  Electrocardiogram Personally Reviewed:

## 2023-07-30 NOTE — PROGRESS NOTE ADULT - ASSESSMENT
83 y/o Female with history of arthritis, dementia  ? BIBEMS from department of  office for evaluation due to concerns for not being able to care for herself. Patient has been in shelter for the past 2 weeks due to homelessness after being evicted from her previous home with her son. Admitted for anemia with suspected GI bleed      1. Anemia r/o GI Bleed, Iron deficient Anemia  - s/p 1 U PRBC improved to 7.8 this AM Hgb 7.4   no old labs for comparison.   GI, PUD ? source. no info about If had colonoscopy in the past, states she had one decades ago, unclear.   pt. refused rectal exam, fecal occult blood test negative  GI Consult- refused workup, signed off until patient willing to undergo prep and testing   Psych Consult- note reviewed. Limited capacity. May not refuse blood transfusion due to life threatening anemia, however may refuse all non emergent procedures or interventions ([place] law)   Attempted to reach out to sons on file multiple times however unreachable.   Now able to speak with Son "iglesia", who assisted in convincing patient to have iron infusion, however continues to refuse colonoscopy, states she will follow outpatient.    Venofer 300 mg ordered, as well as ferrous sulfate 325 oral   monitor to keep Hgb>7, and will transfuse again if needed, however will not reconsult GI without patient permission at this time    2. Dementia   - Delirium precautions  - obtain b12, folate, rpr,   - Psych consult appreciated  - Per son diana, patient is at baseline at this time   - Avoid anticholinergic drugs, benzos, opiod    3. Arthritis    arthritic changes in multiple joints noted, will keep on prn tylenol for pain.    4. Homeless  social work consult, will need placement. SW aware and will work on placement in AM     DVT: SCDs  Diet: Regular

## 2023-07-31 LAB
HCT VFR BLD CALC: 30.6 % — LOW (ref 34.5–45)
HGB BLD-MCNC: 8.7 G/DL — LOW (ref 11.5–15.5)
MCHC RBC-ENTMCNC: 19.4 PG — LOW (ref 27–34)
MCHC RBC-ENTMCNC: 28.4 GM/DL — LOW (ref 32–36)
MCV RBC AUTO: 68.3 FL — LOW (ref 80–100)
PLATELET # BLD AUTO: 182 K/UL — SIGNIFICANT CHANGE UP (ref 150–400)
RBC # BLD: 4.48 M/UL — SIGNIFICANT CHANGE UP (ref 3.8–5.2)
RBC # FLD: 21.2 % — HIGH (ref 10.3–14.5)
WBC # BLD: 5.39 K/UL — SIGNIFICANT CHANGE UP (ref 3.8–10.5)
WBC # FLD AUTO: 5.39 K/UL — SIGNIFICANT CHANGE UP (ref 3.8–10.5)

## 2023-07-31 PROCEDURE — 99232 SBSQ HOSP IP/OBS MODERATE 35: CPT

## 2023-07-31 RX ADMIN — PANTOPRAZOLE SODIUM 40 MILLIGRAM(S): 20 TABLET, DELAYED RELEASE ORAL at 05:54

## 2023-07-31 RX ADMIN — Medication 325 MILLIGRAM(S): at 16:15

## 2023-07-31 RX ADMIN — PANTOPRAZOLE SODIUM 40 MILLIGRAM(S): 20 TABLET, DELAYED RELEASE ORAL at 16:14

## 2023-07-31 NOTE — PROGRESS NOTE ADULT - ASSESSMENT
81 y/o Female with history of arthritis, dementia  ? BIBEMS from department of  office for evaluation due to concerns for not being able to care for herself. Patient has been in shelter for the past 2 weeks due to homelessness after being evicted from her previous home with her son. Admitted for anemia with suspected GI bleed      1. Anemia r/o GI Bleed, Iron deficient Anemia  - s/p 1 U PRBC, now Hgb improved to 8.7  this AM    no old labs for comparison.   GI, PUD ? source. no info about If had colonoscopy in the past, states she had one decades ago, unclear.   pt. refused rectal exam, fecal occult blood test negative  GI Consult- refused workup, signed off until patient willing to undergo prep and testing   Psych Consult- note reviewed. Limited capacity. May not refuse blood transfusion due to life threatening anemia, however may refuse all non emergent procedures or interventions ([place] law)   Attempted to reach out to sons on file multiple times however unreachable.   Now able to speak with Son "iglesia", who assisted in convincing patient to have iron infusion, however continues to refuse colonoscopy, states she will follow outpatient.    Venofer 300 mg given, as well as ferrous sulfate 325 oral   monitor to keep Hgb>7, and will transfuse again if needed, however will not reconsult GI without patient permission at this time    2. Dementia   - Delirium precautions  - obtain b12, folate, rpr,   - Psych consult appreciated  - Per son diana, patient is at baseline at this time   - Avoid anticholinergic drugs, benzos, opiod    3. Arthritis    arthritic changes in multiple joints noted, will keep on prn tylenol for pain.    4. Homeless  social work consult, will need placement. SW aware and will work on placement      DVT: SCDs  Diet: Regular

## 2023-07-31 NOTE — PROGRESS NOTE ADULT - SUBJECTIVE AND OBJECTIVE BOX
[facility] Division of Hospital Medicine    Chief Complaint:      SUBJECTIVE / OVERNIGHT EVENTS:    Patient seen and examined at bedside, no acute events overnight,  patient denies any new complaints. Hgb increased this AM to 8.7, s/p iron transfusion with venofer 300 mg. SW attempting to place patient in shelter, will continue to monitor.      MEDICATIONS  (STANDING):  ferrous    sulfate 325 milliGRAM(s) Oral daily  pantoprazole  Injectable 40 milliGRAM(s) IV Push two times a day    MEDICATIONS  (PRN):  acetaminophen     Tablet .. 650 milliGRAM(s) Oral every 6 hours PRN Mild Pain (1 - 3), Moderate Pain (4 - 6)        I&O's Summary    31 Jul 2023 07:01  -  31 Jul 2023 19:03  --------------------------------------------------------  IN: 240 mL / OUT: 0 mL / NET: 240 mL        PHYSICAL EXAM:  Vital Signs Last 24 Hrs  T(C): 36.6 (31 Jul 2023 16:00), Max: 37 (31 Jul 2023 07:18)  T(F): 97.9 (31 Jul 2023 16:00), Max: 98.6 (31 Jul 2023 07:18)  HR: 81 (31 Jul 2023 16:00) (74 - 94)  BP: 155/87 (31 Jul 2023 16:00) (144/86 - 173/68)  BP(mean): --  RR: 17 (31 Jul 2023 16:00) (17 - 18)  SpO2: 100% (31 Jul 2023 16:00) (88% - 100%)    Parameters below as of 31 Jul 2023 16:00  Patient On (Oxygen Delivery Method): room air      CONSTITUTIONAL: NAD, Pleasant  ENT: poor oral dentition, many missing teeth   RESPIRATORY: Normal respiratory effort; lungs are clear to auscultation bilaterally  CARDIOVASCULAR: Regular rate and rhythm, normal S1 and S2, no murmur/rub/gallop; No lower extremity edema; Peripheral pulses are 2+ bilaterally  ABDOMEN: Nontender to palpation, normoactive bowel sounds, no rebound/guarding; No hepatosplenomegaly  MUSCLOSKELETAL:  Normal gait with assist device; no clubbing or cyanosis of digits; no joint swelling or tenderness to palpation  PSYCH: A+O to name, partially to place and date.  NEUROLOGY: CN 2-12 are intact and symmetric; no gross sensory deficits;   SKIN: No rashes; no palpable lesions    LABS:                        8.7    5.39  )-----------( 182      ( 31 Jul 2023 07:50 )             30.6         CAPILLARY BLOOD GLUCOSE            RADIOLOGY & ADDITIONAL TESTS:  Results Reviewed:   Imaging Personally Reviewed:  Electrocardiogram Personally Reviewed:

## 2023-08-01 LAB
MRSA PCR RESULT.: SIGNIFICANT CHANGE UP
S AUREUS DNA NOSE QL NAA+PROBE: SIGNIFICANT CHANGE UP

## 2023-08-01 PROCEDURE — 99232 SBSQ HOSP IP/OBS MODERATE 35: CPT

## 2023-08-01 RX ORDER — CHLORHEXIDINE GLUCONATE 213 G/1000ML
1 SOLUTION TOPICAL DAILY
Refills: 0 | Status: DISCONTINUED | OUTPATIENT
Start: 2023-08-01 | End: 2023-08-15

## 2023-08-01 RX ADMIN — Medication 325 MILLIGRAM(S): at 16:09

## 2023-08-01 RX ADMIN — CHLORHEXIDINE GLUCONATE 1 APPLICATION(S): 213 SOLUTION TOPICAL at 08:39

## 2023-08-01 RX ADMIN — PANTOPRAZOLE SODIUM 40 MILLIGRAM(S): 20 TABLET, DELAYED RELEASE ORAL at 05:33

## 2023-08-01 RX ADMIN — PANTOPRAZOLE SODIUM 40 MILLIGRAM(S): 20 TABLET, DELAYED RELEASE ORAL at 16:09

## 2023-08-01 NOTE — PROGRESS NOTE ADULT - ASSESSMENT
83 y/o Female with history of arthritis, dementia  ? BIBEMS from department of  office for evaluation due to concerns for not being able to care for herself. Patient has been in shelter for the past 2 weeks due to homelessness after being evicted from her previous home with her son. Admitted for anemia with suspected GI bleed      1. Anemia r/o GI Bleed, Iron deficient Anemia  - s/p 1 U PRBC, now Hgb improved to 8.7  this AM    no old labs for comparison.   GI, PUD ? source. no info about If had colonoscopy in the past, states she had one decades ago, unclear.   pt. refused rectal exam, fecal occult blood test negative  GI Consult- refused workup, signed off until patient willing to undergo prep and testing   Psych Consult- note reviewed. Limited capacity. May not refuse blood transfusion due to life threatening anemia, however may refuse all non emergent procedures or interventions ([place] law)   Attempted to reach out to sons on file multiple times however unreachable.   Now able to speak with Son "iglesia", who assisted in convincing patient to have iron infusion, however continues to refuse colonoscopy, states she will follow outpatient.    Venofer 300 mg given, as well as ferrous sulfate 325 oral   monitor to keep Hgb>7, and will transfuse again if needed, however will not reconsult GI without patient permission at this time    2. Dementia   - Delirium precautions  - potential underlying mood disorder, will discuss with psych again in regards to possible psych placement   - obtain b12, folate, rpr,   - Psych consult appreciated  - Per son diana, patient is at baseline at this time   - Avoid anticholinergic drugs, benzos, opiod    3. Arthritis    arthritic changes in multiple joints noted, will keep on prn tylenol for pain.    4. Homeless  social work consult, will need placement. SW aware and will work on placement      DVT: SCDs  Diet: Regular

## 2023-08-01 NOTE — PROGRESS NOTE ADULT - SUBJECTIVE AND OBJECTIVE BOX
[facility] Division of Hospital Medicine    Chief Complaint:      SUBJECTIVE / OVERNIGHT EVENTS:    Patient seen and examined at bedside, no acute events overnight, patient denies any new complaints. Awaiting placement in facility, will speak with Psych in regards to possibility for psych placement as opposed to long term NH.     MEDICATIONS  (STANDING):  chlorhexidine 2% Cloths 1 Application(s) Topical daily  ferrous    sulfate 325 milliGRAM(s) Oral daily  pantoprazole  Injectable 40 milliGRAM(s) IV Push two times a day    MEDICATIONS  (PRN):  acetaminophen     Tablet .. 650 milliGRAM(s) Oral every 6 hours PRN Mild Pain (1 - 3), Moderate Pain (4 - 6)        I&O's Summary    31 Jul 2023 07:01  -  01 Aug 2023 07:00  --------------------------------------------------------  IN: 240 mL / OUT: 0 mL / NET: 240 mL    01 Aug 2023 07:01  -  01 Aug 2023 16:34  --------------------------------------------------------  IN: 720 mL / OUT: 0 mL / NET: 720 mL        PHYSICAL EXAM:  Vital Signs Last 24 Hrs  T(C): 36.8 (01 Aug 2023 12:34), Max: 36.9 (01 Aug 2023 05:33)  T(F): 98.3 (01 Aug 2023 12:34), Max: 98.5 (01 Aug 2023 09:01)  HR: 80 (01 Aug 2023 12:34) (73 - 82)  BP: 153/73 (01 Aug 2023 12:34) (152/85 - 157/94)  BP(mean): --  RR: 18 (01 Aug 2023 12:34) (18 - 18)  SpO2: 100% (01 Aug 2023 12:34) (96% - 100%)    Parameters below as of 01 Aug 2023 12:34  Patient On (Oxygen Delivery Method): room air      CONSTITUTIONAL: NAD, Pleasant  ENT: poor oral dentition, many missing teeth   RESPIRATORY: Normal respiratory effort; lungs are clear to auscultation bilaterally  CARDIOVASCULAR: Regular rate and rhythm, normal S1 and S2, no murmur/rub/gallop; No lower extremity edema; Peripheral pulses are 2+ bilaterally  ABDOMEN: Nontender to palpation, normoactive bowel sounds, no rebound/guarding; No hepatosplenomegaly  MUSCLOSKELETAL:  Normal gait with assist device; no clubbing or cyanosis of digits; no joint swelling or tenderness to palpation  PSYCH: A+O to name, partially to place and date.  NEUROLOGY: CN 2-12 are intact and symmetric; no gross sensory deficits;   SKIN: No rashes; no palpable lesions    LABS:                        8.7    5.39  )-----------( 182      ( 31 Jul 2023 07:50 )             30.6                     CAPILLARY BLOOD GLUCOSE            RADIOLOGY & ADDITIONAL TESTS:  Results Reviewed:   Imaging Personally Reviewed:  Electrocardiogram Personally Reviewed:

## 2023-08-02 LAB
HCT VFR BLD CALC: 31.4 % — LOW (ref 34.5–45)
HGB BLD-MCNC: 8.9 G/DL — LOW (ref 11.5–15.5)
MCHC RBC-ENTMCNC: 19.8 PG — LOW (ref 27–34)
MCHC RBC-ENTMCNC: 28.3 GM/DL — LOW (ref 32–36)
MCV RBC AUTO: 69.9 FL — LOW (ref 80–100)
PLATELET # BLD AUTO: 202 K/UL — SIGNIFICANT CHANGE UP (ref 150–400)
RBC # BLD: 4.49 M/UL — SIGNIFICANT CHANGE UP (ref 3.8–5.2)
RBC # FLD: 23.2 % — HIGH (ref 10.3–14.5)
WBC # BLD: 5.54 K/UL — SIGNIFICANT CHANGE UP (ref 3.8–10.5)
WBC # FLD AUTO: 5.54 K/UL — SIGNIFICANT CHANGE UP (ref 3.8–10.5)

## 2023-08-02 PROCEDURE — 99232 SBSQ HOSP IP/OBS MODERATE 35: CPT

## 2023-08-02 RX ORDER — SODIUM CHLORIDE 0.65 %
1 AEROSOL, SPRAY (ML) NASAL
Refills: 0 | Status: DISCONTINUED | OUTPATIENT
Start: 2023-08-02 | End: 2023-08-15

## 2023-08-02 RX ADMIN — CHLORHEXIDINE GLUCONATE 1 APPLICATION(S): 213 SOLUTION TOPICAL at 07:45

## 2023-08-02 RX ADMIN — PANTOPRAZOLE SODIUM 40 MILLIGRAM(S): 20 TABLET, DELAYED RELEASE ORAL at 05:00

## 2023-08-02 NOTE — PROGRESS NOTE ADULT - ASSESSMENT
81 y/o Female with history of arthritis, dementia  ? BIBEMS from department of  office for evaluation due to concerns for not being able to care for herself. Patient has been in shelter for the past 2 weeks due to homelessness after being evicted from her previous home with her son. Admitted for anemia with suspected GI bleed      1. Anemia, Iron deficient Anemia  - s/p 1 U PRBC, now Hgb improved to 8.9  this AM    no old labs for comparison.   - has remained stable since 7/30 likely no active bleed, however would continue to recommend colonoscopy as an outpatient   GI, PUD source? less likely. no info about If had colonoscopy in the past, states she had one decades ago, unclear.   pt. refused rectal exam, fecal occult blood test negative  GI Consult- refused workup, signed off until patient willing to undergo prep and testing   Psych Consult- note reviewed. Limited capacity. May not refuse blood transfusion due to life threatening anemia, however may refuse all non emergent procedures or interventions ([place] law)   Attempted to reach out to sons on file multiple times however unreachable.   Now able to speak with Son "iglesia", who assisted in convincing patient to have iron infusion, however continues to refuse colonoscopy, states she will follow outpatient.    Venofer 300 mg given, as well as ferrous sulfate 325 oral   monitor to keep Hgb>7, and will transfuse again if needed, however will not reconsult GI without patient permission at this time    2. Dementia   - Delirium precautions  - potential underlying mood disorder, will discuss with psych again in regards to possible psych placement   - obtain b12, folate, rpr,   - Psych consult appreciated  - Per son diana, patient is at baseline at this time   - Avoid anticholinergic drugs, benzos, opiod    3. Arthritis    arthritic changes in multiple joints noted, will keep on prn tylenol for pain.    4. Homeless  social work consult, will need placement. SW aware and will work on placement      DVT: SCDs  Diet: Regular

## 2023-08-02 NOTE — PROGRESS NOTE ADULT - SUBJECTIVE AND OBJECTIVE BOX
[facility] Division of Hospital Medicine    Chief Complaint:      SUBJECTIVE / OVERNIGHT EVENTS:    Patient seen and examined at bedside, no acute events overnight, patient denies any new complaints. Continuing to work towards placement as homeless shelter will no longer take her. Psych recalled for comparison eval, Hgb remains stable.     MEDICATIONS  (STANDING):  chlorhexidine 2% Cloths 1 Application(s) Topical daily  ferrous    sulfate 325 milliGRAM(s) Oral daily  pantoprazole  Injectable 40 milliGRAM(s) IV Push two times a day    MEDICATIONS  (PRN):  acetaminophen     Tablet .. 650 milliGRAM(s) Oral every 6 hours PRN Mild Pain (1 - 3), Moderate Pain (4 - 6)        I&O's Summary    01 Aug 2023 07:01  -  02 Aug 2023 07:00  --------------------------------------------------------  IN: 720 mL / OUT: 0 mL / NET: 720 mL    02 Aug 2023 07:01  -  02 Aug 2023 14:28  --------------------------------------------------------  IN: 240 mL / OUT: 0 mL / NET: 240 mL        PHYSICAL EXAM:  Vital Signs Last 24 Hrs  T(C): 36.6 (02 Aug 2023 10:51), Max: 36.9 (01 Aug 2023 16:51)  T(F): 97.9 (02 Aug 2023 10:51), Max: 98.4 (01 Aug 2023 16:51)  HR: 76 (02 Aug 2023 10:51) (76 - 82)  BP: 164/93 (02 Aug 2023 10:51) (159/77 - 167/79)  BP(mean): --  RR: 18 (02 Aug 2023 10:51) (18 - 18)  SpO2: 100% (02 Aug 2023 10:51) (98% - 100%)    Parameters below as of 02 Aug 2023 04:36  Patient On (Oxygen Delivery Method): room air      CONSTITUTIONAL: NAD, Pleasant  ENT: poor oral dentition, many missing teeth   RESPIRATORY: Normal respiratory effort; lungs are clear to auscultation bilaterally  CARDIOVASCULAR: Regular rate and rhythm, normal S1 and S2, no murmur/rub/gallop; No lower extremity edema; Peripheral pulses are 2+ bilaterally  ABDOMEN: Nontender to palpation, normoactive bowel sounds, no rebound/guarding; No hepatosplenomegaly  MUSCLOSKELETAL:  Normal gait with assist device; no clubbing or cyanosis of digits; no joint swelling or tenderness to palpation  PSYCH: A+O to name, partially to place and date.  NEUROLOGY: CN 2-12 are intact and symmetric; no gross sensory deficits;   SKIN: No rashes; no palpable lesions    LABS:                        8.9    5.54  )-----------( 202      ( 02 Aug 2023 10:10 )             31.4                     CAPILLARY BLOOD GLUCOSE            RADIOLOGY & ADDITIONAL TESTS:  Results Reviewed:   Imaging Personally Reviewed:  Electrocardiogram Personally Reviewed:

## 2023-08-03 PROCEDURE — 99232 SBSQ HOSP IP/OBS MODERATE 35: CPT

## 2023-08-03 RX ADMIN — Medication 325 MILLIGRAM(S): at 13:06

## 2023-08-03 RX ADMIN — CHLORHEXIDINE GLUCONATE 1 APPLICATION(S): 213 SOLUTION TOPICAL at 13:07

## 2023-08-03 RX ADMIN — PANTOPRAZOLE SODIUM 40 MILLIGRAM(S): 20 TABLET, DELAYED RELEASE ORAL at 18:24

## 2023-08-03 NOTE — DIETITIAN INITIAL EVALUATION ADULT - ORAL INTAKE PTA/DIET HISTORY
Pt is undomiciled, was living in a shelter. She is a little confused.  reports good PO intake, poor dentition noted, however pt would like to remain on  a Reg diet. Pt  reports UBW 110lbs. Per bed scale- weight 103lbs. Current height approx 4'10 but pt reporting to be 5'3. Protein foods encouraged, pt agreeable to supplement.

## 2023-08-03 NOTE — DIETITIAN INITIAL EVALUATION ADULT - PROBLEM SELECTOR PLAN 1
Hb 6.9, etiology ?   no old labs for comparison.   GI, PUD ? source. no info about If had colonoscopy in the past.   chronic anemia ? with drop in Hb  pt. refused rectal exam  ER physician ordered 1 unit of prbc but pt. is refusing to get blood, stated " I don't want it " tried to explain that 1 unit of blood will help her blood count but still does not want it. capacity ? with suspected dementia.   will request for anemia work up  GI consult called by ER physician

## 2023-08-03 NOTE — DIETITIAN INITIAL EVALUATION ADULT - PERTINENT MEDS FT
MEDICATIONS  (STANDING):  chlorhexidine 2% Cloths 1 Application(s) Topical daily  ferrous    sulfate 325 milliGRAM(s) Oral daily  pantoprazole  Injectable 40 milliGRAM(s) IV Push two times a day    MEDICATIONS  (PRN):  acetaminophen     Tablet .. 650 milliGRAM(s) Oral every 6 hours PRN Mild Pain (1 - 3), Moderate Pain (4 - 6)  sodium chloride 0.65% Nasal 1 Spray(s) Both Nostrils four times a day PRN Nasal Congestion

## 2023-08-03 NOTE — DIETITIAN INITIAL EVALUATION ADULT - HEIGHT FOR BMI (CENTIMETERS)
HPI:   Russell Brice is a 60year old male who presents for initial visit with provider for the management of his diabetes     Patient presents with:  Diabetes: new pt     Diabetes: uncontrolled, needs improvement  Type: 2   Duration:5-6 years  Current M buPROPion HCl ER, XL, 150 MG Oral Tablet 24 Hr, Take 1 tablet (150 mg total) by mouth daily. •  atenolol 100 MG Oral Tab, Take 1 tablet (100 mg total) by mouth daily. •  AmLODIPine Besylate 10 MG Oral Tab, Take 1 tablet (10 mg total) by mouth daily. pain on exertion  GI: denies abdominal pain and denies heartburn  NEURO: denies headaches     EXAM:   BP (!) 140/92   Pulse 73   Ht 6' 1\" (1.854 m)   Wt (!) 371 lb 12.8 oz (168.6 kg)   SpO2 98%   BMI 49.05 kg/m²  Estimated body mass index is 49.05 kg/m² a activity. As for his hypertension, Blood Pressure is no significant medication side effects noted, poorly controlled, needs further observation, needs improvement, needs to follow diet more regularly.    PLAN: will continue present medications, reviewed 45.0-49.9, adult [facility])       Return in about 4 weeks (around 2/24/2021) for 45 minute appointment. The risks and benefits of my recommendations, as well as other treatment options were discussed with the patient today.  questions were answered to the bes 147.32

## 2023-08-03 NOTE — PROGRESS NOTE ADULT - SUBJECTIVE AND OBJECTIVE BOX
[facility] Division of Hospital Medicine    Chief Complaint:      SUBJECTIVE / OVERNIGHT EVENTS:    Patient seen and examined at bedside, no acute events overnight, patient denies any new complaints. yesterday patient had episode of epistaxis requiring nasal packing however self resolved within minutes. Asymptomatic today. Continuing to work on placement, medicaid officials spoke with her yesterday, she could not properly understand the talk.     MEDICATIONS  (STANDING):  chlorhexidine 2% Cloths 1 Application(s) Topical daily  ferrous    sulfate 325 milliGRAM(s) Oral daily  pantoprazole  Injectable 40 milliGRAM(s) IV Push two times a day    MEDICATIONS  (PRN):  acetaminophen     Tablet .. 650 milliGRAM(s) Oral every 6 hours PRN Mild Pain (1 - 3), Moderate Pain (4 - 6)  sodium chloride 0.65% Nasal 1 Spray(s) Both Nostrils four times a day PRN Nasal Congestion        I&O's Summary    02 Aug 2023 07:01  -  03 Aug 2023 07:00  --------------------------------------------------------  IN: 240 mL / OUT: 0 mL / NET: 240 mL        PHYSICAL EXAM:  Vital Signs Last 24 Hrs  T(C): 36.6 (03 Aug 2023 11:12), Max: 36.8 (03 Aug 2023 04:34)  T(F): 97.8 (03 Aug 2023 11:12), Max: 98.2 (03 Aug 2023 04:34)  HR: 80 (03 Aug 2023 11:12) (76 - 80)  BP: 160/80 (03 Aug 2023 11:12) (139/77 - 169/70)  BP(mean): --  RR: 18 (03 Aug 2023 11:12) (18 - 18)  SpO2: 99% (03 Aug 2023 11:12) (98% - 100%)    Parameters below as of 03 Aug 2023 11:12  Patient On (Oxygen Delivery Method): room air      CONSTITUTIONAL: NAD, Pleasant  ENT: poor oral dentition, many missing teeth   RESPIRATORY: Normal respiratory effort; lungs are clear to auscultation bilaterally  CARDIOVASCULAR: Regular rate and rhythm, normal S1 and S2, no murmur/rub/gallop; No lower extremity edema; Peripheral pulses are 2+ bilaterally  ABDOMEN: Nontender to palpation, normoactive bowel sounds, no rebound/guarding; No hepatosplenomegaly  MUSCLOSKELETAL:  Normal gait with assist device; no clubbing or cyanosis of digits; no joint swelling or tenderness to palpation  PSYCH: A+O to name, partially to place and date.  NEUROLOGY: CN 2-12 are intact and symmetric; no gross sensory deficits;   SKIN: No rashes; no palpable lesions    LABS:                        8.9    5.54  )-----------( 202      ( 02 Aug 2023 10:10 )             31.4             CAPILLARY BLOOD GLUCOSE            RADIOLOGY & ADDITIONAL TESTS:  Results Reviewed:   Imaging Personally Reviewed:  Electrocardiogram Personally Reviewed:

## 2023-08-03 NOTE — PROGRESS NOTE ADULT - ASSESSMENT
83 y/o Female with history of arthritis, dementia  ? BIBEMS from department of  office for evaluation due to concerns for not being able to care for herself. Patient has been in shelter for the past 2 weeks due to homelessness after being evicted from her previous home with her son. Admitted for anemia, suspicion for GI bleed, refused colonoscopy, Found to be iron deficient, Hgb currently stable with fecal occult blood negative, given iron and awaiting placement.       1. Anemia, Iron deficient Anemia  - s/p 1 U PRBC, now Hgb improved to 8.9   no old labs for comparison.   - has remained stable since 7/30 likely no active bleed, however would continue to recommend colonoscopy as an outpatient   GI, PUD source? less likely. no info about If had colonoscopy in the past, states she had one decades ago, unclear.   pt. refused rectal exam, fecal occult blood test negative  GI Consult- refused workup, signed off until patient willing to undergo prep and testing   Psych Consult- note reviewed. Limited capacity. May not refuse blood transfusion due to life threatening anemia, however may refuse all non emergent procedures or interventions ([place] law)    Son "iglesia", assisted in convincing patient to have iron infusion, however continues to refuse colonoscopy, states she will follow outpatient.    Venofer 300 mg given, as well as ferrous sulfate 325 oral   monitor to keep Hgb>7, and will transfuse again if needed, however will not reconsult GI without patient permission at this time    2. Dementia   - Delirium precautions  - potential underlying mood disorder, will discuss with psych again in regards to possible psych placement   - obtain b12, folate, rpr,   - Psych consult appreciated  - Per son diana, patient is at baseline at this time   - Avoid anticholinergic drugs, benzos, opiod    3. Arthritis    arthritic changes in multiple joints noted, will keep on prn tylenol for pain.    4. Homeless  social work consult, will need placement. SW aware and will work on placement, continuing to work on placement, patient has difficulties as shelters do not want her back, and she has poor insight and ability to care for finances to pay for or understand medicaid which may be needed for long term placement.      DVT: SCDs  Diet: Regular

## 2023-08-03 NOTE — DIETITIAN INITIAL EVALUATION ADULT - OTHER INFO
81 y/o Female with history of arthritis, dementia  ? BIBEMS from department of  office for evaluation due to concerns for not being able to care for herself. Patient has been in shelter for the past 2 weeks due to homelessness after being evicted from her previous home with her son. Admitted for anemia with suspected GI bleed

## 2023-08-03 NOTE — DIETITIAN INITIAL EVALUATION ADULT - PROBLEM SELECTOR PLAN 3
social work consult, will need placement.  Psych consult requested if pt. has capacity and also  will likely need psych eval for placement.

## 2023-08-04 PROCEDURE — 99232 SBSQ HOSP IP/OBS MODERATE 35: CPT

## 2023-08-04 RX ORDER — AMLODIPINE BESYLATE 2.5 MG/1
5 TABLET ORAL DAILY
Refills: 0 | Status: DISCONTINUED | OUTPATIENT
Start: 2023-08-04 | End: 2023-08-15

## 2023-08-04 RX ADMIN — CHLORHEXIDINE GLUCONATE 1 APPLICATION(S): 213 SOLUTION TOPICAL at 10:37

## 2023-08-04 RX ADMIN — PANTOPRAZOLE SODIUM 40 MILLIGRAM(S): 20 TABLET, DELAYED RELEASE ORAL at 17:06

## 2023-08-04 RX ADMIN — PANTOPRAZOLE SODIUM 40 MILLIGRAM(S): 20 TABLET, DELAYED RELEASE ORAL at 06:03

## 2023-08-04 RX ADMIN — AMLODIPINE BESYLATE 5 MILLIGRAM(S): 2.5 TABLET ORAL at 10:37

## 2023-08-04 RX ADMIN — Medication 325 MILLIGRAM(S): at 10:37

## 2023-08-04 NOTE — PROGRESS NOTE ADULT - TIME BILLING
Attempting to work with patient and  for homeless shelter placement, patient does not wish to go somewhere without son, however may not have an option.
Discussion with psych / SW in regards to placement of patient who is not wanted back at homeless shelter.
lengthy discussion with son and patient at bedside in regards to risks and benefits of colonoscopy, possibility of placement in shelters and side effects / benefits of iron including infusion vs oral.
Discussion with Psych in regards to need for comparison evaluation, possible psych placement. Discussed with SW in regards to placement options, discussed with patient in regards to need for environment for safe discharge.
Ongoing discussion with SW and patient in regards to patient, CM having difficulties finding placement at this time.
SW discussions in regards to potential placement and options, potential need for ethics involvement. Discussion with patient in regards to plans.

## 2023-08-04 NOTE — PROGRESS NOTE ADULT - ASSESSMENT
81 y/o Female with history of arthritis, dementia  ? BIBEMS from department of  office for evaluation due to concerns for not being able to care for herself. Patient has been in shelter for the past 2 weeks due to homelessness after being evicted from her previous home with her son. Admitted for anemia, suspicion for GI bleed, refused colonoscopy, Found to be iron deficient, Hgb currently stable with fecal occult blood negative, given iron and awaiting placement. Continue to work with SW for placement, difficult to place       1. Anemia, Iron deficient Anemia  - s/p 1 U PRBC, now Hgb improved to 8.9   no old labs for comparison.   - has remained stable since 7/30 likely no active bleed, however would continue to recommend colonoscopy as an outpatient   GI, PUD source less likely. no info about If had colonoscopy in the past, states she had one decades ago, unclear.   pt. refused rectal exam, fecal occult blood test negative  GI Consult- refused workup, signed off until patient willing to undergo prep and testing   Psych Consult- note reviewed. Limited capacity. May not refuse blood transfusion due to life threatening anemia, however may refuse all non emergent procedures or interventions ([place] law)    Son "iglesia", assisted in convincing patient to have iron infusion, however continues to refuse colonoscopy, states she will follow outpatient.    Venofer 300 mg given, as well as ferrous sulfate 325 oral   monitor to keep Hgb>7, and will transfuse again if needed, however will not reconsult GI without patient permission at this time    2. Dementia   - Delirium precautions  - potential underlying mood disorder, will discuss with psych again in regards to possible psych placement   - Psych consult appreciated  - Per son diana, patient is at baseline at this time   - Avoid anticholinergic drugs, benzos, opioid    3. Arthritis    arthritic changes in multiple joints noted, will keep on prn tylenol for pain.    4. Homeless  social work consult, will need placement. SW aware and will work on placement, continuing to work on placement, patient has difficulties as shelters do not want her back, and she has poor insight and ability to care for finances to pay for or understand medicaid which may be needed for long term placement.

## 2023-08-04 NOTE — PROGRESS NOTE ADULT - SUBJECTIVE AND OBJECTIVE BOX
[facility] Division of Hospital Medicine    Chief Complaint:      SUBJECTIVE / OVERNIGHT EVENTS:    Patient seen and examined at bedside, no acute events overnight, patient denies any new complaints. Continue to await SW for placement / NH options given patient is unsafe for DC on her own.     MEDICATIONS  (STANDING):  amLODIPine   Tablet 5 milliGRAM(s) Oral daily  chlorhexidine 2% Cloths 1 Application(s) Topical daily  ferrous    sulfate 325 milliGRAM(s) Oral daily  pantoprazole  Injectable 40 milliGRAM(s) IV Push two times a day    MEDICATIONS  (PRN):  acetaminophen     Tablet .. 650 milliGRAM(s) Oral every 6 hours PRN Mild Pain (1 - 3), Moderate Pain (4 - 6)  sodium chloride 0.65% Nasal 1 Spray(s) Both Nostrils four times a day PRN Nasal Congestion        I&O's Summary      PHYSICAL EXAM:  Vital Signs Last 24 Hrs  T(C): 37.2 (04 Aug 2023 10:00), Max: 37.2 (04 Aug 2023 10:00)  T(F): 98.9 (04 Aug 2023 10:00), Max: 98.9 (04 Aug 2023 10:00)  HR: 99 (04 Aug 2023 10:00) (76 - 99)  BP: 148/83 (04 Aug 2023 10:00) (148/83 - 154/85)  BP(mean): --  RR: 16 (04 Aug 2023 10:00) (16 - 18)  SpO2: 94% (04 Aug 2023 10:00) (94% - 99%)    Parameters below as of 04 Aug 2023 10:00  Patient On (Oxygen Delivery Method): room air      CONSTITUTIONAL: NAD, Pleasant  ENT: poor oral dentition, many missing teeth   RESPIRATORY: Normal respiratory effort; lungs are clear to auscultation bilaterally  CARDIOVASCULAR: Regular rate and rhythm, normal S1 and S2, no murmur/rub/gallop; No lower extremity edema; Peripheral pulses are 2+ bilaterally  ABDOMEN: Nontender to palpation, normoactive bowel sounds, no rebound/guarding; No hepatosplenomegaly  MUSCLOSKELETAL:  Normal gait with assist device; no clubbing or cyanosis of digits; no joint swelling or tenderness to palpation  PSYCH: A+O to name, partially to place and date.  NEUROLOGY: CN 2-12 are intact and symmetric; no gross sensory deficits;   SKIN: No rashes; no palpable lesions      LABS:                    CAPILLARY BLOOD GLUCOSE            RADIOLOGY & ADDITIONAL TESTS:  Results Reviewed:   Imaging Personally Reviewed:  Electrocardiogram Personally Reviewed:

## 2023-08-05 PROCEDURE — 99232 SBSQ HOSP IP/OBS MODERATE 35: CPT

## 2023-08-05 RX ADMIN — CHLORHEXIDINE GLUCONATE 1 APPLICATION(S): 213 SOLUTION TOPICAL at 08:18

## 2023-08-05 RX ADMIN — AMLODIPINE BESYLATE 5 MILLIGRAM(S): 2.5 TABLET ORAL at 05:11

## 2023-08-05 RX ADMIN — PANTOPRAZOLE SODIUM 40 MILLIGRAM(S): 20 TABLET, DELAYED RELEASE ORAL at 05:11

## 2023-08-05 RX ADMIN — Medication 325 MILLIGRAM(S): at 16:19

## 2023-08-05 RX ADMIN — PANTOPRAZOLE SODIUM 40 MILLIGRAM(S): 20 TABLET, DELAYED RELEASE ORAL at 16:19

## 2023-08-05 NOTE — PROGRESS NOTE ADULT - SUBJECTIVE AND OBJECTIVE BOX
[facility] Division of Medicine    Chief Complaint: Anemia    SUBJECTIVE / OVERNIGHT EVENTS: Pt seen at the bedside. Patient denies chest pain, SOB, abd pain, N/V, fever, chills, dysuria or any other complaints. All remainder ROS negative.     MEDICATIONS  (STANDING):  amLODIPine   Tablet 5 milliGRAM(s) Oral daily  chlorhexidine 2% Cloths 1 Application(s) Topical daily  ferrous    sulfate 325 milliGRAM(s) Oral daily  pantoprazole  Injectable 40 milliGRAM(s) IV Push two times a day    MEDICATIONS  (PRN):  acetaminophen     Tablet .. 650 milliGRAM(s) Oral every 6 hours PRN Mild Pain (1 - 3), Moderate Pain (4 - 6)  sodium chloride 0.65% Nasal 1 Spray(s) Both Nostrils four times a day PRN Nasal Congestion      I&O's Summary      PHYSICAL EXAM:  Vital Signs Last 24 Hrs  T(C): 36.4 (05 Aug 2023 16:33), Max: 36.7 (05 Aug 2023 04:30)  T(F): 97.6 (05 Aug 2023 16:33), Max: 98 (05 Aug 2023 04:30)  HR: 82 (05 Aug 2023 16:33) (80 - 89)  BP: 152/82 (05 Aug 2023 16:33) (141/84 - 159/84)  BP(mean): --  RR: 18 (05 Aug 2023 16:33) (18 - 18)  SpO2: 99% (05 Aug 2023 16:33) (97% - 99%)    Parameters below as of 05 Aug 2023 16:33  Patient On (Oxygen Delivery Method): room air        CONSTITUTIONAL: NAD  RESPIRATORY: Normal respiratory effort; lungs are clear to auscultation bilaterally  CARDIOVASCULAR: Regular rate and rhythm, normal S1 and S2. No lower extremity edema; Peripheral pulses are 2+ bilaterally  ABDOMEN: Nontender to palpation, non distended  PSYCH: A+O to person, place, and time; affect appropriate  SKIN: No rashes or bruises    LABS:                    CAPILLARY BLOOD GLUCOSE          IMAGING:

## 2023-08-05 NOTE — PROGRESS NOTE ADULT - ASSESSMENT
81 y/o Female with history of arthritis, dementia  ? BIBEMS from department of  office for evaluation due to concerns for not being able to care for herself. Patient has been in shelter for the past 2 weeks due to homelessness after being evicted from her previous home with her son. Admitted for anemia, suspicion for GI bleed, refused colonoscopy, Found to be iron deficient, Hgb currently stable with fecal occult blood negative, given iron and awaiting placement. Continue to work with SW for placement, difficult to place       1. Anemia, Iron deficient Anemia  - s/p 1 U PRBC, now Hgb improved to 8.9   no old labs for comparison.   - has remained stable since 7/30 likely no active bleed, however would continue to recommend colonoscopy as an outpatient   GI, PUD source less likely. no info about If had colonoscopy in the past, states she had one decades ago, unclear.   pt. refused rectal exam, fecal occult blood test negative  GI Consult- refused workup, signed off until patient willing to undergo prep and testing   Psych Consult- note reviewed. Limited capacity. May not refuse blood transfusion due to life threatening anemia, however may refuse all non emergent procedures or interventions ([place] law)    Son "iglesia", assisted in convincing patient to have iron infusion, however continues to refuse colonoscopy, states she will follow outpatient.    Venofer 300 mg given, as well as ferrous sulfate 325 oral   monitor to keep Hgb>7, and will transfuse again if needed, however will not reconsult GI without patient permission at this time    2. Dementia   - Delirium precautions  - potential underlying mood disorder, will discuss with psych again in regards to possible psych placement   - Psych consult appreciated  - Per son diana, patient is at baseline at this time   - Avoid anticholinergic drugs, benzos, opioid    3. Arthritis    arthritic changes in multiple joints noted, will keep on prn tylenol for pain.    4. Homeless  - SW aware and will work on placement, patient has difficulties as shelters do not want her back, and she has poor insight and ability to care of finances or understand medicaid which may be needed for long term placement.

## 2023-08-06 LAB
HCT VFR BLD CALC: 31 % — LOW (ref 34.5–45)
HGB BLD-MCNC: 8.8 G/DL — LOW (ref 11.5–15.5)
MCHC RBC-ENTMCNC: 20.6 PG — LOW (ref 27–34)
MCHC RBC-ENTMCNC: 28.4 GM/DL — LOW (ref 32–36)
MCV RBC AUTO: 72.4 FL — LOW (ref 80–100)
PLATELET # BLD AUTO: 169 K/UL — SIGNIFICANT CHANGE UP (ref 150–400)
RBC # BLD: 4.28 M/UL — SIGNIFICANT CHANGE UP (ref 3.8–5.2)
RBC # FLD: 26.3 % — HIGH (ref 10.3–14.5)
WBC # BLD: 4.24 K/UL — SIGNIFICANT CHANGE UP (ref 3.8–10.5)
WBC # FLD AUTO: 4.24 K/UL — SIGNIFICANT CHANGE UP (ref 3.8–10.5)

## 2023-08-06 PROCEDURE — 99232 SBSQ HOSP IP/OBS MODERATE 35: CPT

## 2023-08-06 RX ORDER — MENTHOL AND METHYL SALICYLATE 10; 30 G/100G; G/100G
1 CREAM TOPICAL
Refills: 0 | Status: DISCONTINUED | OUTPATIENT
Start: 2023-08-06 | End: 2023-08-15

## 2023-08-06 RX ADMIN — PANTOPRAZOLE SODIUM 40 MILLIGRAM(S): 20 TABLET, DELAYED RELEASE ORAL at 16:29

## 2023-08-06 RX ADMIN — PANTOPRAZOLE SODIUM 40 MILLIGRAM(S): 20 TABLET, DELAYED RELEASE ORAL at 05:24

## 2023-08-06 RX ADMIN — Medication 325 MILLIGRAM(S): at 16:29

## 2023-08-06 RX ADMIN — CHLORHEXIDINE GLUCONATE 1 APPLICATION(S): 213 SOLUTION TOPICAL at 07:13

## 2023-08-06 RX ADMIN — MENTHOL AND METHYL SALICYLATE 1 APPLICATION(S): 10; 30 CREAM TOPICAL at 14:09

## 2023-08-06 RX ADMIN — AMLODIPINE BESYLATE 5 MILLIGRAM(S): 2.5 TABLET ORAL at 05:24

## 2023-08-06 NOTE — PROGRESS NOTE ADULT - SUBJECTIVE AND OBJECTIVE BOX
[facility] Division of Medicine    Chief Complaint:  Anemia    SUBJECTIVE / OVERNIGHT EVENTS: Pt seen at the bedside. Patient denies chest pain, SOB, abd pain, N/V, fever, chills, dysuria or any other complaints. All remainder ROS negative.     MEDICATIONS  (STANDING):  amLODIPine   Tablet 5 milliGRAM(s) Oral daily  chlorhexidine 2% Cloths 1 Application(s) Topical daily  ferrous    sulfate 325 milliGRAM(s) Oral daily  pantoprazole  Injectable 40 milliGRAM(s) IV Push two times a day    MEDICATIONS  (PRN):  acetaminophen     Tablet .. 650 milliGRAM(s) Oral every 6 hours PRN Mild Pain (1 - 3), Moderate Pain (4 - 6)  methyl salicylate 15%/menthol 10% Topical Cream 1 Application(s) Topical two times a day PRN pain  sodium chloride 0.65% Nasal 1 Spray(s) Both Nostrils four times a day PRN Nasal Congestion      I&O's Summary    06 Aug 2023 07:01  -  06 Aug 2023 16:59  --------------------------------------------------------  IN: 720 mL / OUT: 0 mL / NET: 720 mL        PHYSICAL EXAM:  Vital Signs Last 24 Hrs  T(C): 36.7 (06 Aug 2023 08:11), Max: 36.8 (05 Aug 2023 21:10)  T(F): 98 (06 Aug 2023 08:11), Max: 98.2 (05 Aug 2023 21:10)  HR: 70 (06 Aug 2023 08:11) (70 - 87)  BP: 142/60 (06 Aug 2023 08:11) (137/80 - 150/79)  BP(mean): --  RR: 16 (06 Aug 2023 08:11) (16 - 18)  SpO2: 96% (06 Aug 2023 08:11) (96% - 99%)    Parameters below as of 06 Aug 2023 08:11  Patient On (Oxygen Delivery Method): room air      CONSTITUTIONAL: NAD  RESPIRATORY: Normal respiratory effort; lungs are clear to auscultation bilaterally  CARDIOVASCULAR: Regular rate and rhythm, normal S1 and S2. No lower extremity edema; Peripheral pulses are 2+ bilaterally  ABDOMEN: Nontender to palpation, non distended  PSYCH: A+O to person, place, and time; affect appropriate  SKIN: No rashes or bruises    LABS:                        8.8    4.24  )-----------( 169      ( 06 Aug 2023 08:04 )             31.0                     CAPILLARY BLOOD GLUCOSE          IMAGING:

## 2023-08-06 NOTE — PHYSICAL THERAPY INITIAL EVALUATION ADULT - ADDITIONAL COMMENTS
Pt lived in a shelter and now has no place to live  Pt owns medical equipment: [employer]   Pt lives with: Son  Someone is always available to provide assist.

## 2023-08-06 NOTE — PHYSICAL THERAPY INITIAL EVALUATION ADULT - PERTINENT HX OF CURRENT PROBLEM, REHAB EVAL
From 7/27 [facility] & P   pt. is an 83 y/o Female with history of arthritis, dementia  ? BIBEMS from department of  office for evaluation due to concerns for not being able to care for herself. Patient has been in shelter for the past 2 weeks due to homelessness after being evicted from her previous home with her son. Patient denies any complaints at this time. Denies fevers, chills, headache, chest pain, palpitations, shortness of breath, cough, abd. pain, nausea, vomiting, diarrhea, hematuria, vaginal bleed, dysuria, dark stools, bloody vomitus, focal neurologic symptoms. pt. stated that she is not sure if allergic to any medicines.   In the ER pt's Hb 6.9, no old labs for comparison pt. is refusing rectal exam. ER team given one unit of prbc. pt. stated that she is not on any meds like motrin, advil or any other prescribed meds. i called pt's sons iglesia and luis e , stiven in our system. someone picked up from the number for iglesia but stated it was not his place. no answer from luis e phone.

## 2023-08-06 NOTE — PHYSICAL THERAPY INITIAL EVALUATION ADULT - ACTIVE RANGE OF MOTION EXAMINATION, REHAB EVAL
Bilateral UE AROM to approx 90 bilaterally/bilateral upper extremity Active ROM was WFL (within functional limits)/bilateral  lower extremity Active ROM was WFL (within functional limits)/deficits as listed below

## 2023-08-06 NOTE — PROGRESS NOTE ADULT - ASSESSMENT
83 y/o Female with history of arthritis, dementia  ? BIBEMS from department of  office for evaluation due to concerns for not being able to care for herself. Patient has been in shelter for the past 2 weeks due to homelessness after being evicted from her previous home with her son. Admitted for anemia, suspicion for GI bleed, refused colonoscopy, Found to be iron deficient, Hgb currently stable with fecal occult blood negative, given iron and awaiting placement. Continue to work with SW for placement, difficult to place       1. Anemia, Iron deficient Anemia  - s/p 1 U PRBC, now Hgb improved to 8.9   no old labs for comparison.   - has remained stable since 7/30 likely no active bleed, however would continue to recommend colonoscopy as an outpatient   GI, PUD source less likely. no info about If had colonoscopy in the past, states she had one decades ago, unclear.   pt. refused rectal exam, fecal occult blood test negative  GI Consult- refused workup, signed off until patient willing to undergo prep and testing   Psych Consult- note reviewed. Limited capacity. May not refuse blood transfusion due to life threatening anemia, however may refuse all non emergent procedures or interventions ([place] law)    Son "iglesia", assisted in convincing patient to have iron infusion, however continues to refuse colonoscopy, states she will follow outpatient.    Venofer 300 mg given, as well as ferrous sulfate 325 oral   monitor to keep Hgb>7, and will transfuse again if needed, however will not reconsult GI without patient permission at this time    2. Dementia   - Delirium precautions  - potential underlying mood disorder, will discuss with psych again in regards to possible psych placement   - Psych consult appreciated  - Per son diana, patient is at baseline at this time   - Avoid anticholinergic drugs, benzos, opioid    3. Arthritis    arthritic changes in multiple joints noted, will keep on prn tylenol for pain.    4. Homeless  - SW aware and will work on placement, patient has difficulties as shelters do not want her back, and she has poor insight and ability to care of finances or understand medicaid which may be needed for long term placement.

## 2023-08-07 PROCEDURE — 99232 SBSQ HOSP IP/OBS MODERATE 35: CPT

## 2023-08-07 RX ADMIN — CHLORHEXIDINE GLUCONATE 1 APPLICATION(S): 213 SOLUTION TOPICAL at 12:11

## 2023-08-07 RX ADMIN — Medication 325 MILLIGRAM(S): at 18:47

## 2023-08-07 RX ADMIN — PANTOPRAZOLE SODIUM 40 MILLIGRAM(S): 20 TABLET, DELAYED RELEASE ORAL at 06:02

## 2023-08-07 RX ADMIN — AMLODIPINE BESYLATE 5 MILLIGRAM(S): 2.5 TABLET ORAL at 06:02

## 2023-08-07 RX ADMIN — PANTOPRAZOLE SODIUM 40 MILLIGRAM(S): 20 TABLET, DELAYED RELEASE ORAL at 18:47

## 2023-08-07 NOTE — PROGRESS NOTE ADULT - ASSESSMENT
83 y/o Female with history of arthritis, dementia  ? BIBEMS from department of  office for evaluation due to concerns for not being able to care for herself. Patient has been in shelter for the past 2 weeks due to homelessness after being evicted from her previous home with her son. Admitted for anemia, suspicion for GI bleed, refused colonoscopy, Found to be iron deficient, Hgb currently stable with fecal occult blood negative, given iron and awaiting placement. Continue to work with SW for placement, difficult to place       1. Anemia, Iron deficient Anemia  - had 1 unit prbc transfused in beginning of admission   - no old labs for comparison.   - has remained stable since 7/30 likely no active bleed, however would continue to recommend colonoscopy as an outpatient   GI, PUD source less likely. no info about If had colonoscopy in the past, states she had one decades ago, unclear.   pt. refused rectal exam, fecal occult blood test negative  GI Consult- refused workup, signed off until patient willing to undergo prep and testing   Psych Consult- note reviewed. Limited capacity. May not refuse blood transfusion due to life threatening anemia, however may refuse all non emergent procedures or interventions ([place] law)    Son "iglesia", assisted in convincing patient to have iron infusion, however continues to refuse colonoscopy, states she will follow outpatient.    Venofer 300 mg given, as well as ferrous sulfate 325 oral   monitor to keep Hgb>7, and will transfuse again if needed, however will not reconsult GI without patient permission at this time    2. Dementia   - Delirium precautions  - potential underlying mood disorder, will discuss with psych again in regards to possible psych placement   - Psych consult appreciated  - Per son diana, patient is at baseline at this time   - Avoid anticholinergic drugs, benzos, opioid    3. Arthritis    arthritic changes in multiple joints noted, will keep on prn tylenol for pain.    4. Homeless  - SW aware and will work on placement, patient has difficulties as shelters do not want her back, and she has poor insight and ability to care of finances or understand medicaid which may be needed for long term placement.

## 2023-08-07 NOTE — PROGRESS NOTE ADULT - SUBJECTIVE AND OBJECTIVE BOX
[facility] Division of Medicine    Chief Complaint:  Anemia    SUBJECTIVE / OVERNIGHT EVENTS: Pt seen at the bedside. Patient denies chest pain, SOB, abd pain, N/V, fever, chills, dysuria or any other complaints. All remainder ROS negative.     MEDICATIONS  (STANDING):  amLODIPine   Tablet 5 milliGRAM(s) Oral daily  chlorhexidine 2% Cloths 1 Application(s) Topical daily  ferrous    sulfate 325 milliGRAM(s) Oral daily  pantoprazole  Injectable 40 milliGRAM(s) IV Push two times a day    MEDICATIONS  (PRN):  acetaminophen     Tablet .. 650 milliGRAM(s) Oral every 6 hours PRN Mild Pain (1 - 3), Moderate Pain (4 - 6)  methyl salicylate 15%/menthol 10% Topical Cream 1 Application(s) Topical two times a day PRN pain  sodium chloride 0.65% Nasal 1 Spray(s) Both Nostrils four times a day PRN Nasal Congestion      I&O's Summary    06 Aug 2023 07:01  -  07 Aug 2023 07:00  --------------------------------------------------------  IN: 720 mL / OUT: 0 mL / NET: 720 mL        PHYSICAL EXAM:  Vital Signs Last 24 Hrs  T(C): 36.4 (07 Aug 2023 10:59), Max: 36.8 (06 Aug 2023 20:51)  T(F): 97.6 (07 Aug 2023 10:59), Max: 98.3 (06 Aug 2023 20:51)  HR: 84 (07 Aug 2023 10:59) (83 - 84)  BP: 150/71 (07 Aug 2023 10:59) (138/79 - 150/71)  BP(mean): --  RR: 18 (07 Aug 2023 10:59) (18 - 18)  SpO2: 98% (07 Aug 2023 10:59) (98% - 98%)    Parameters below as of 07 Aug 2023 10:59  Patient On (Oxygen Delivery Method): room air      CONSTITUTIONAL: NAD  RESPIRATORY: Normal respiratory effort; lungs are clear to auscultation bilaterally  CARDIOVASCULAR: Regular rate and rhythm, normal S1 and S2. No lower extremity edema; Peripheral pulses are 2+ bilaterally  ABDOMEN: Nontender to palpation, non distended  PSYCH: A+O to person, place, and time; affect appropriate  SKIN: No rashes or bruises    LABS:                        8.8    4.24  )-----------( 169      ( 06 Aug 2023 08:04 )             31.0                     CAPILLARY BLOOD GLUCOSE          IMAGING:

## 2023-08-08 PROCEDURE — 99232 SBSQ HOSP IP/OBS MODERATE 35: CPT

## 2023-08-08 RX ADMIN — CHLORHEXIDINE GLUCONATE 1 APPLICATION(S): 213 SOLUTION TOPICAL at 07:09

## 2023-08-08 RX ADMIN — Medication 325 MILLIGRAM(S): at 15:31

## 2023-08-08 RX ADMIN — PANTOPRAZOLE SODIUM 40 MILLIGRAM(S): 20 TABLET, DELAYED RELEASE ORAL at 15:31

## 2023-08-08 RX ADMIN — PANTOPRAZOLE SODIUM 40 MILLIGRAM(S): 20 TABLET, DELAYED RELEASE ORAL at 06:22

## 2023-08-08 RX ADMIN — AMLODIPINE BESYLATE 5 MILLIGRAM(S): 2.5 TABLET ORAL at 06:22

## 2023-08-08 NOTE — PROGRESS NOTE ADULT - SUBJECTIVE AND OBJECTIVE BOX
[facility] Division of Medicine    Chief Complaint:  Anemia    SUBJECTIVE / OVERNIGHT EVENTS: Pt seen at the bedside. Patient denies chest pain, SOB, abd pain, N/V, fever, chills, dysuria or any other complaints. All remainder ROS negative.     MEDICATIONS  (STANDING):  amLODIPine   Tablet 5 milliGRAM(s) Oral daily  chlorhexidine 2% Cloths 1 Application(s) Topical daily  ferrous    sulfate 325 milliGRAM(s) Oral daily  pantoprazole  Injectable 40 milliGRAM(s) IV Push two times a day    MEDICATIONS  (PRN):  acetaminophen     Tablet .. 650 milliGRAM(s) Oral every 6 hours PRN Mild Pain (1 - 3), Moderate Pain (4 - 6)  methyl salicylate 15%/menthol 10% Topical Cream 1 Application(s) Topical two times a day PRN pain  sodium chloride 0.65% Nasal 1 Spray(s) Both Nostrils four times a day PRN Nasal Congestion      I&O's Summary    07 Aug 2023 07:01  -  08 Aug 2023 07:00  --------------------------------------------------------  IN: 120 mL / OUT: 0 mL / NET: 120 mL    08 Aug 2023 07:01  -  08 Aug 2023 16:04  --------------------------------------------------------  IN: 720 mL / OUT: 0 mL / NET: 720 mL        PHYSICAL EXAM:  Vital Signs Last 24 Hrs  T(C): 36.8 (08 Aug 2023 15:54), Max: 36.9 (08 Aug 2023 11:00)  T(F): 98.2 (08 Aug 2023 15:54), Max: 98.4 (08 Aug 2023 11:00)  HR: 84 (08 Aug 2023 15:54) (83 - 95)  BP: 143/83 (08 Aug 2023 15:54) (130/86 - 163/83)  BP(mean): 101 (07 Aug 2023 21:45) (101 - 101)  RR: 18 (08 Aug 2023 15:54) (18 - 18)  SpO2: 96% (08 Aug 2023 15:54) (93% - 98%)    Parameters below as of 08 Aug 2023 15:54  Patient On (Oxygen Delivery Method): room air    CONSTITUTIONAL: NAD  RESPIRATORY: Normal respiratory effort; lungs are clear to auscultation bilaterally  CARDIOVASCULAR: Regular rate and rhythm, normal S1 and S2. No lower extremity edema; Peripheral pulses are 2+ bilaterally  ABDOMEN: Nontender to palpation, non distended  PSYCH: A+O to person, place, and time; affect appropriate  SKIN: No rashes or bruises    LABS:                    CAPILLARY BLOOD GLUCOSE          IMAGING:

## 2023-08-08 NOTE — PROGRESS NOTE ADULT - ASSESSMENT
81 y/o Female with history of arthritis, dementia  ? BIBEMS from department of  office for evaluation due to concerns for not being able to care for herself. Patient has been in shelter for the past 2 weeks due to homelessness after being evicted from her previous home with her son. Admitted for anemia, suspicion for GI bleed, refused colonoscopy, Found to be iron deficient, Hgb currently stable with fecal occult blood negative, given iron and awaiting placement. Continue to work with SW for placement, difficult to place       1. Anemia, Iron deficient Anemia  - had 1 unit prbc transfused in beginning of admission   - no old labs for comparison.   - has remained stable since 7/30 likely no active bleed, however would continue to recommend colonoscopy as an outpatient   GI, PUD source less likely. no info about If had colonoscopy in the past, states she had one decades ago, unclear.   pt. refused rectal exam, fecal occult blood test negative  GI Consult- refused workup, signed off until patient willing to undergo prep and testing   Psych Consult- note reviewed. Limited capacity. May not refuse blood transfusion due to life threatening anemia, however may refuse all non emergent procedures or interventions ([place] law)    Son "iglesia", assisted in convincing patient to have iron infusion, however continues to refuse colonoscopy, states she will follow outpatient.    Venofer 300 mg given, as well as ferrous sulfate 325 oral   monitor to keep Hgb>7, and will transfuse again if needed, however will not reconsult GI without patient permission at this time    2. Dementia   - Delirium precautions  - potential underlying mood disorder, will discuss with psych again in regards to possible psych placement   - Psych consult appreciated  - Per son diana, patient is at baseline at this time   - Avoid anticholinergic drugs, benzos, opioid    3. Arthritis    arthritic changes in multiple joints noted, will keep on prn tylenol for pain.    4. Homeless  - SW aware, report son needs to contact facilities made available to him to disclose financial information for clearance

## 2023-08-09 PROCEDURE — 99232 SBSQ HOSP IP/OBS MODERATE 35: CPT

## 2023-08-09 RX ADMIN — PANTOPRAZOLE SODIUM 40 MILLIGRAM(S): 20 TABLET, DELAYED RELEASE ORAL at 05:23

## 2023-08-09 RX ADMIN — PANTOPRAZOLE SODIUM 40 MILLIGRAM(S): 20 TABLET, DELAYED RELEASE ORAL at 18:07

## 2023-08-09 RX ADMIN — AMLODIPINE BESYLATE 5 MILLIGRAM(S): 2.5 TABLET ORAL at 05:26

## 2023-08-09 RX ADMIN — Medication 325 MILLIGRAM(S): at 18:07

## 2023-08-09 RX ADMIN — CHLORHEXIDINE GLUCONATE 1 APPLICATION(S): 213 SOLUTION TOPICAL at 10:11

## 2023-08-09 NOTE — PROGRESS NOTE ADULT - ASSESSMENT
81 y/o Female with history of arthritis, dementia  ? BIBEMS from department of  office for evaluation due to concerns for not being able to care for herself. Patient has been in shelter for the past 2 weeks due to homelessness after being evicted from her previous home with her son. Admitted for anemia, suspicion for GI bleed, refused colonoscopy, Found to be iron deficient, Hgb currently stable with fecal occult blood negative, given iron and awaiting placement. Continue to work with SW for placement, difficult to place       1. Anemia, Iron deficient Anemia  - had 1 unit prbc transfused in beginning of admission   - no old labs for comparison.   - has remained stable since 7/30 likely no active bleed, however would continue to recommend colonoscopy as an outpatient   GI, PUD source less likely. no info about If had colonoscopy in the past, states she had one decades ago, unclear.   pt. refused rectal exam, fecal occult blood test negative  GI Consult- refused workup, signed off until patient willing to undergo prep and testing   Psych Consult- note reviewed. Limited capacity. May not refuse blood transfusion due to life threatening anemia, however may refuse all non emergent procedures or interventions ([place] law)    Son "iglesia", assisted in convincing patient to have iron infusion, however continues to refuse colonoscopy, states she will follow outpatient.    Venofer 300 mg given, as well as ferrous sulfate 325 oral   monitor to keep Hgb>7, and will transfuse again if needed, however will not reconsult GI without patient permission at this time    2. Dementia   - Delirium precautions  - potential underlying mood disorder, will discuss with psych again in regards to possible psych placement   - Psych consult appreciated  - Per son diana, patient is at baseline at this time   - Avoid anticholinergic drugs, benzos, opioid    Anxiety  - chronic, recent stressors have pt concerned about ever leaving the hospital  - no SI/HI   - psych consult    3. Arthritis    arthritic changes in multiple joints noted, will keep on prn tylenol for pain.    4. Homeless  - SW aware, report son needs to contact facilities made available to him to disclose financial information for clearance

## 2023-08-09 NOTE — PROGRESS NOTE ADULT - SUBJECTIVE AND OBJECTIVE BOX
[facility] Division of Medicine      Chief Complaint:  Anemia    SUBJECTIVE / OVERNIGHT EVENTS: Pt seen at the bedside. Patient denies chest pain, SOB, abd pain, N/V, fever, chills, dysuria or any other complaints. All remainder ROS negative.     MEDICATIONS  (STANDING):  amLODIPine   Tablet 5 milliGRAM(s) Oral daily  chlorhexidine 2% Cloths 1 Application(s) Topical daily  ferrous    sulfate 325 milliGRAM(s) Oral daily  pantoprazole  Injectable 40 milliGRAM(s) IV Push two times a day    MEDICATIONS  (PRN):  acetaminophen     Tablet .. 650 milliGRAM(s) Oral every 6 hours PRN Mild Pain (1 - 3), Moderate Pain (4 - 6)  methyl salicylate 15%/menthol 10% Topical Cream 1 Application(s) Topical two times a day PRN pain  sodium chloride 0.65% Nasal 1 Spray(s) Both Nostrils four times a day PRN Nasal Congestion      I&O's Summary    08 Aug 2023 07:01  -  09 Aug 2023 07:00  --------------------------------------------------------  IN: 960 mL / OUT: 0 mL / NET: 960 mL        PHYSICAL EXAM:  Vital Signs Last 24 Hrs  T(C): 36.8 (09 Aug 2023 10:54), Max: 36.8 (08 Aug 2023 15:54)  T(F): 98.3 (09 Aug 2023 10:54), Max: 98.3 (09 Aug 2023 10:54)  HR: 83 (09 Aug 2023 10:54) (78 - 84)  BP: 136/83 (09 Aug 2023 10:54) (130/74 - 156/88)  BP(mean): 92 (08 Aug 2023 22:00) (92 - 92)  RR: 18 (09 Aug 2023 10:54) (18 - 18)  SpO2: 98% (09 Aug 2023 10:54) (96% - 98%)    Parameters below as of 09 Aug 2023 10:54  Patient On (Oxygen Delivery Method): room air      CONSTITUTIONAL: NAD  RESPIRATORY: Normal respiratory effort; lungs are clear to auscultation bilaterally  CARDIOVASCULAR: Regular rate and rhythm, normal S1 and S2. No lower extremity edema; Peripheral pulses are 2+ bilaterally  ABDOMEN: Nontender to palpation, non distended  PSYCH: A+O to person, place, and time; affect appropriate  SKIN: No rashes or bruisesrashes    LABS:                    CAPILLARY BLOOD GLUCOSE          IMAGING:

## 2023-08-10 PROCEDURE — 99232 SBSQ HOSP IP/OBS MODERATE 35: CPT

## 2023-08-10 RX ADMIN — PANTOPRAZOLE SODIUM 40 MILLIGRAM(S): 20 TABLET, DELAYED RELEASE ORAL at 05:18

## 2023-08-10 RX ADMIN — Medication 325 MILLIGRAM(S): at 15:14

## 2023-08-10 RX ADMIN — PANTOPRAZOLE SODIUM 40 MILLIGRAM(S): 20 TABLET, DELAYED RELEASE ORAL at 15:14

## 2023-08-10 RX ADMIN — CHLORHEXIDINE GLUCONATE 1 APPLICATION(S): 213 SOLUTION TOPICAL at 09:48

## 2023-08-10 RX ADMIN — AMLODIPINE BESYLATE 5 MILLIGRAM(S): 2.5 TABLET ORAL at 05:16

## 2023-08-10 RX ADMIN — MENTHOL AND METHYL SALICYLATE 1 APPLICATION(S): 10; 30 CREAM TOPICAL at 15:11

## 2023-08-10 NOTE — BH CONSULTATION LIAISON PROGRESS NOTE - NSBHASSESSMENTFT_PSY_ALL_CORE
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 While in the hospital, patient found with significant anemia requiring transfusion but currently refusing. psychiatry consulted to evaluate patients capacity to refuse.     Patient seen and evaluated and found to be a poor historian with poor attention and memory. No known formal diagnosis of dementia but history of forgetfulness as per son (mild cognitive impairment?). In regards to patients capacity, patient is able to express a choice but currently lacks appreciation, and reasoning related to her current medical condition(anemia) which limits her capacity to refuse blood transfusion     8/2: patient seen for follow up and found to be calm and cooperative with no reported s/h ideation or AVH.   in regards to patients cognition, patient with some improvement but still with mild cognitive deficits in direct memory. possible baseline dementia/ mild cognitive impairment but unclear.     Dx.   Delirium  r/o dementia     Recs   -Maintain delirium precautions   -Avoid anticholinergic agents, benzos, opioid as they can further perpetuate confusion   -Frequent re orientation, Hydration, try to avoid restraints and if possible, mobilize patient and PT involvement   
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 While in the hospital, patient found with significant anemia requiring transfusion but currently refusing. psychiatry consulted to evaluate patients capacity to refuse.     Patient seen and evaluated and found to be a poor historian with poor attention and memory. No known formal diagnosis of dementia but history of forgetfulness as per son (mild cognitive impairment?). In regards to patients capacity, patient is able to express a choice but currently lacks appreciation, and reasoning related to her current medical condition(anemia) which limits her capacity to refuse blood transfusion     8/2: patient seen for follow up and found to be calm and cooperative with no reported s/h ideation or AVH.   in regards to patients cognition, patient with some improvement but still with mild cognitive deficits in direct memory. possible baseline dementia/ mild cognitive impairment but unclear.     8/10: patient seen and expressing stress related to her social/living situation with no daily dysfunction (eating well, sleeping well). Social work following for placement     Dx.   Delirium  r/o dementia     Recs   -Maintain delirium precautions   -Avoid anticholinergic agents, benzos, opioid as they can further perpetuate confusion   -Frequent re orientation, Hydration, try to avoid restraints and if possible, mobilize patient and PT involvement

## 2023-08-10 NOTE — BH CONSULTATION LIAISON PROGRESS NOTE - NSBHCHARTREVIEWVS_PSY_A_CORE FT
Vital Signs Last 24 Hrs  T(C): 36.6 (02 Aug 2023 10:51), Max: 36.9 (01 Aug 2023 16:51)  T(F): 97.9 (02 Aug 2023 10:51), Max: 98.4 (01 Aug 2023 16:51)  HR: 76 (02 Aug 2023 10:51) (76 - 82)  BP: 164/93 (02 Aug 2023 10:51) (159/77 - 167/79)  BP(mean): --  RR: 18 (02 Aug 2023 10:51) (18 - 18)  SpO2: 100% (02 Aug 2023 10:51) (98% - 100%)    Parameters below as of 02 Aug 2023 04:36  Patient On (Oxygen Delivery Method): room air    
Vital Signs Last 24 Hrs  T(C): 37.1 (10 Aug 2023 11:16), Max: 37.1 (10 Aug 2023 11:16)  T(F): 98.8 (10 Aug 2023 11:16), Max: 98.8 (10 Aug 2023 11:16)  HR: 79 (10 Aug 2023 11:16) (77 - 91)  BP: 119/67 (10 Aug 2023 11:16) (119/67 - 154/84)  BP(mean): --  RR: 18 (10 Aug 2023 11:16) (18 - 18)  SpO2: 89% (10 Aug 2023 11:16) (89% - 100%)    Parameters below as of 10 Aug 2023 11:16  Patient On (Oxygen Delivery Method): room air

## 2023-08-10 NOTE — BH CONSULTATION LIAISON PROGRESS NOTE - NSBHCONSULTFOLLOWAFTERCARE_PSY_A_CORE FT
may benefit from outpatient neurology f/u and neurocognitive testing 
may benefit from outpatient neurology f/u and neurocognitive testing

## 2023-08-10 NOTE — PROGRESS NOTE ADULT - ASSESSMENT
· Assessment	  83 y/o Female with history of arthritis, dementia  ? BIBEMS from department of  office for evaluation due to concerns for not being able to care for herself. Patient has been in shelter for the past 2 weeks due to homelessness after being evicted from her previous home with her son. Admitted for anemia with suspected GI bleed    1. Anemia r/o GI Bleed, Iron deficient Anemia  - s/p 1 U PRBC, now Hgb improved to 8.7  this AM    no old labs for comparison.   GI, PUD ? source. no info about If had colonoscopy in the past, states she had one decades ago, unclear.   pt. refused rectal exam, fecal occult blood test negative  GI Consult- refused workup, signed off until patient willing to undergo prep and testing   Psych Consult- note reviewed. Limited capacity. May not refuse blood transfusion due to life threatening anemia, however may refuse all non emergent procedures or interventions ([place] law)   Attempted to reach out to sons on file multiple times however unreachable.   Now able to speak with Son "iglesia", who assisted in convincing patient to have iron infusion, however continues to refuse colonoscopy, states she will follow outpatient.    Venofer 300 mg given, as well as ferrous sulfate 325 oral   monitor to keep Hgb>7, and will transfuse again if needed, however will not reconsult GI without patient permission at this time    2. Dementia   - Delirium precautions  - potential underlying mood disorder, will discuss with psych again in regards to possible psych placement   - obtain b12, folate, rpr,   - Psych consult appreciated  - Per son diana, patient is at baseline at this time   - Avoid anticholinergic drugs, benzos, opiod    3. Arthritis    arthritic changes in multiple joints noted, will keep on prn tylenol for pain.    4. Homeless  social work consult, will need placement. SW aware and will work on placement      DVT: SCDs  Diet: Regular

## 2023-08-10 NOTE — PROGRESS NOTE ADULT - SUBJECTIVE AND OBJECTIVE BOX
[facility] Division of Medicine    Chief Complaint:  Anemia    SUBJECTIVE / OVERNIGHT EVENTS: Pt seen at the bedside. Patient denies chest pain, SOB, abd pain, N/V, fever, chills, dysuria or any other complaints. All remainder ROS negative.     MEDICATIONS  (STANDING):  amLODIPine   Tablet 5 milliGRAM(s) Oral daily  chlorhexidine 2% Cloths 1 Application(s) Topical daily  ferrous    sulfate 325 milliGRAM(s) Oral daily  pantoprazole  Injectable 40 milliGRAM(s) IV Push two times a day    MEDICATIONS  (PRN):  acetaminophen     Tablet .. 650 milliGRAM(s) Oral every 6 hours PRN Mild Pain (1 - 3), Moderate Pain (4 - 6)  methyl salicylate 15%/menthol 10% Topical Cream 1 Application(s) Topical two times a day PRN pain  sodium chloride 0.65% Nasal 1 Spray(s) Both Nostrils four times a day PRN Nasal Congestion      I&O's Summary    09 Aug 2023 07:01  -  10 Aug 2023 07:00  --------------------------------------------------------  IN: 240 mL / OUT: 0 mL / NET: 240 mL    10 Aug 2023 07:01  -  10 Aug 2023 15:23  --------------------------------------------------------  IN: 480 mL / OUT: 0 mL / NET: 480 mL        PHYSICAL EXAM:  Vital Signs Last 24 Hrs  T(C): 37.1 (10 Aug 2023 11:16), Max: 37.1 (10 Aug 2023 11:16)  T(F): 98.8 (10 Aug 2023 11:16), Max: 98.8 (10 Aug 2023 11:16)  HR: 79 (10 Aug 2023 11:16) (77 - 91)  BP: 119/67 (10 Aug 2023 11:16) (119/67 - 154/84)  BP(mean): --  RR: 18 (10 Aug 2023 11:16) (18 - 18)  SpO2: 89% (10 Aug 2023 11:16) (89% - 100%)    Parameters below as of 10 Aug 2023 11:16  Patient On (Oxygen Delivery Method): room air    CONSTITUTIONAL: NAD  RESPIRATORY: Normal respiratory effort; lungs are clear to auscultation bilaterally  CARDIOVASCULAR: Regular rate and rhythm, normal S1 and S2. No lower extremity edema; Peripheral pulses are 2+ bilaterally  ABDOMEN: Nontender to palpation, non distended  PSYCH: A+O to person, place, and time; affect appropriate  SKIN: No rashes or bruisesrashes

## 2023-08-10 NOTE — BH CONSULTATION LIAISON PROGRESS NOTE - NSBHFUPINTERVALHXFT_PSY_A_CORE
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 While in the hospital, patient found with significant anemia requiring transfusion but currently refusing.     Psychiatry reconsulted for anxiety     Patient seen for follow up and found to be calm and cooperative. Patient alert, oriented to person, partially to place (did not know which hospital she is in or which town), partially to time (did not know the year), and partially to situation. patient talks of her social situation on how she wishes she could find a place to live with her son Matthew and was told she had an option to go to a NH in [facility] but states it is too far from her son and worries not being able to see him. She reports stress related to the situation but denies anxiety, reports good appetite and sleep with no s/h ideation or AVH 
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 While in the hospital, patient found with significant anemia requiring transfusion but currently refusing.     Patient seen for follow up and found to be calm and cooperative. Patient alert, oriented to person, partially to place (did not know which hospital she is in or which town), partially to time (did not know the year), and not to situation. Patient seen and again talking of how she was sent in from the  shelter. when asked why she state " I think it was my blood pressure". patient re oriented to fact that she was anemic and required a blood transfusion which she then acknowledged. currently stating she wishes she can live in the shelter with her son diana and also states she does not know where her other sons are. Patient currently reports good sleep and appetite, states the staff here have been kind to her and she denies any s/h ideation or AVH     Cognition  Orientation as above  3 word recall: 3/3 ---> 2/3   WORLD backwards : 5/5

## 2023-08-10 NOTE — BH CONSULTATION LIAISON PROGRESS NOTE - NSBHCHARTREVIEWLAB_PSY_A_CORE FT
Basic Metabolic Panel in AM (07.28.23 @ 02:24)    Sodium: 137 mmol/L    Potassium: 4.1 mmol/L    Chloride: 105: Chloride reference range changed from ..10/26/2022  . mmol/L    Carbon Dioxide: 23.0 mmol/L    Anion Gap: 9 mmol/L    Blood Urea Nitrogen: 16.3 mg/dL    Creatinine: 0.66 mg/dL    Glucose: 89 mg/dL    Calcium: 8.7 mg/dL    eGFR: 87: The estimated glomerular filtration rate (eGFR) is calculated using the  2021 CKD-EPI creatinine equation, which does not have a coefficient for  race and is validated in individuals 18 years of age and older (N Engl J  Med 2021; 385:4358-7312). Creatinine-based eGFR may be inaccurate in  various situations including but not limited to extremes of muscle mass,  altered dietary protein intake, or medications that affect renal tubular  creatinine secretion. mL/min/1.73m2  
Basic Metabolic Panel in AM (07.28.23 @ 02:24)    Sodium: 137 mmol/L    Potassium: 4.1 mmol/L    Chloride: 105: Chloride reference range changed from ..10/26/2022  . mmol/L    Carbon Dioxide: 23.0 mmol/L    Anion Gap: 9 mmol/L    Blood Urea Nitrogen: 16.3 mg/dL    Creatinine: 0.66 mg/dL    Glucose: 89 mg/dL    Calcium: 8.7 mg/dL    eGFR: 87: The estimated glomerular filtration rate (eGFR) is calculated using the  2021 CKD-EPI creatinine equation, which does not have a coefficient for  race and is validated in individuals 18 years of age and older (N Engl J  Med 2021; 385:4046-3761). Creatinine-based eGFR may be inaccurate in  various situations including but not limited to extremes of muscle mass,  altered dietary protein intake, or medications that affect renal tubular  creatinine secretion. mL/min/1.73m2

## 2023-08-10 NOTE — BH CONSULTATION LIAISON PROGRESS NOTE - CURRENT MEDICATION
MEDICATIONS  (STANDING):  amLODIPine   Tablet 5 milliGRAM(s) Oral daily  chlorhexidine 2% Cloths 1 Application(s) Topical daily  ferrous    sulfate 325 milliGRAM(s) Oral daily  pantoprazole  Injectable 40 milliGRAM(s) IV Push two times a day    MEDICATIONS  (PRN):  acetaminophen     Tablet .. 650 milliGRAM(s) Oral every 6 hours PRN Mild Pain (1 - 3), Moderate Pain (4 - 6)  methyl salicylate 15%/menthol 10% Topical Cream 1 Application(s) Topical two times a day PRN pain  sodium chloride 0.65% Nasal 1 Spray(s) Both Nostrils four times a day PRN Nasal Congestion  
MEDICATIONS  (STANDING):  chlorhexidine 2% Cloths 1 Application(s) Topical daily  ferrous    sulfate 325 milliGRAM(s) Oral daily  pantoprazole  Injectable 40 milliGRAM(s) IV Push two times a day    MEDICATIONS  (PRN):  acetaminophen     Tablet .. 650 milliGRAM(s) Oral every 6 hours PRN Mild Pain (1 - 3), Moderate Pain (4 - 6)  sodium chloride 0.65% Nasal 1 Spray(s) Both Nostrils four times a day PRN Nasal Congestion

## 2023-08-11 DIAGNOSIS — R04.0 EPISTAXIS: ICD-10-CM

## 2023-08-11 LAB
APTT BLD: 33.4 SEC — SIGNIFICANT CHANGE UP (ref 24.5–35.6)
HCT VFR BLD CALC: 33.9 % — LOW (ref 34.5–45)
HGB BLD-MCNC: 9.6 G/DL — LOW (ref 11.5–15.5)
INR BLD: 0.98 RATIO — SIGNIFICANT CHANGE UP (ref 0.85–1.18)
MCHC RBC-ENTMCNC: 21.1 PG — LOW (ref 27–34)
MCHC RBC-ENTMCNC: 28.3 GM/DL — LOW (ref 32–36)
MCV RBC AUTO: 74.3 FL — LOW (ref 80–100)
PLATELET # BLD AUTO: 218 K/UL — SIGNIFICANT CHANGE UP (ref 150–400)
PROTHROM AB SERPL-ACNC: 10.9 SEC — SIGNIFICANT CHANGE UP (ref 9.5–13)
RBC # BLD: 4.56 M/UL — SIGNIFICANT CHANGE UP (ref 3.8–5.2)
RBC # FLD: 28.1 % — HIGH (ref 10.3–14.5)
WBC # BLD: 4.43 K/UL — SIGNIFICANT CHANGE UP (ref 3.8–10.5)
WBC # FLD AUTO: 4.43 K/UL — SIGNIFICANT CHANGE UP (ref 3.8–10.5)

## 2023-08-11 PROCEDURE — 30901 CONTROL OF NOSEBLEED: CPT | Mod: LT

## 2023-08-11 PROCEDURE — 99232 SBSQ HOSP IP/OBS MODERATE 35: CPT

## 2023-08-11 PROCEDURE — 99221 1ST HOSP IP/OBS SF/LOW 40: CPT | Mod: 25

## 2023-08-11 RX ADMIN — Medication 325 MILLIGRAM(S): at 16:06

## 2023-08-11 RX ADMIN — AMLODIPINE BESYLATE 5 MILLIGRAM(S): 2.5 TABLET ORAL at 06:44

## 2023-08-11 RX ADMIN — PANTOPRAZOLE SODIUM 40 MILLIGRAM(S): 20 TABLET, DELAYED RELEASE ORAL at 16:06

## 2023-08-11 RX ADMIN — CHLORHEXIDINE GLUCONATE 1 APPLICATION(S): 213 SOLUTION TOPICAL at 08:03

## 2023-08-11 RX ADMIN — PANTOPRAZOLE SODIUM 40 MILLIGRAM(S): 20 TABLET, DELAYED RELEASE ORAL at 06:44

## 2023-08-11 NOTE — PROVIDER CONTACT NOTE (OTHER) - ASSESSMENT
after 10 minutes of applying pressure to bridge of nose and direct pressure with nasal packing, bleeding is continuing

## 2023-08-11 NOTE — CONSULT NOTE ADULT - PROBLEM SELECTOR RECOMMENDATION 9
Microcytic anemia, Hgb 6.9 gm MCV 66. Repeat Hgb 6.3gm. Unable to perform rectal exam as patient continue to refuse physical examination.  Patient would benefit from endoscopic evaluation to r/o sources of GI bleed. However she   Severe anemia will increase the risk of myocardial infarction and or death   Patient continue to refuse blood transfusion  despite detailed explanation.   Please evaluate her capacity to make medical decision.   Continue Protonix BID  Avoid NSAIDs  Trend CBC, transfuse to Hgb 7 or higher. Will consider EGD once the patient agree to proceed with further care.  Discussed with bedside nurse. Psych eval pending.
- Rapid rhino replaced with another 5.5cm rapid rhino in left nare, no further bleeding noted. Will be reassessed daily by team, can likely remove Monday if still does not have bleeding  - Saline spray 2 sprays TID   - No heavy lifting, no bending below the waist and no noseblowing.  - ENT will continue to follow, please call [phone] for further questions.

## 2023-08-11 NOTE — CHART NOTE - NSCHARTNOTEFT_GEN_A_CORE
Call by MD to place rhino rocket as pt with left nare epistaxis.  Rapid Rhino Placed in Left Nare.  Inflated to 15 CCs.  No further evidence of active bleeding from nares or in OC/OP after placement.  Pt. Tolerated Procedure well.  ENT consult placed as pt with hx of epistaxis sometimes lasting over an hour.  Pt not on any anticoags at this time.  RN advise to call PA if epistaxis continues.
Hgb now 6.3 and pt still refusing blood transfusion.   Had a discussion with pt at bedside. When asking why pt doesn't want to have a transfusion pt states, "I don't need to tell you why."   Spoke about risks of not receiving transfusion but pt adamant about her decision.
Source: Patient [ ]  Family [ ]   other [x ]  83 y/o Female with history of arthritis, dementia  ? BIBEMS from department of  office for evaluation due to concerns for not being able to care for herself. Patient has been in shelter for the past 2 weeks due to homelessness after being evicted from her previous home with her son. Admitted for anemia with suspected GI bleed  Anemia r/o GI Bleed, Iron deficient Anemia    Current Diet: Diet, Regular (07-28-23 @ 08:51)      Patient reports [ ] nausea  [ ] vomiting [ ] diarrhea [ ] constipation  [ ]chewing problems [ ] swallowing issues  [ ] other:     PO intake:  < 50% [ ]   50-75%  [ ]   %  [ ]  other :    Source for PO intake [ ] Patient [ ] family [ ] chart [ ] staff [ ] other        Current Weight:   7/27 68  % Weight Change   Edema :  2+ legs    Pertinent Medications: MEDICATIONS  (STANDING):  amLODIPine   Tablet 5 milliGRAM(s) Oral daily  chlorhexidine 2% Cloths 1 Application(s) Topical daily  ferrous    sulfate 325 milliGRAM(s) Oral daily  pantoprazole  Injectable 40 milliGRAM(s) IV Push two times a day    MEDICATIONS  (PRN):  acetaminophen     Tablet .. 650 milliGRAM(s) Oral every 6 hours PRN Mild Pain (1 - 3), Moderate Pain (4 - 6)  methyl salicylate 15%/menthol 10% Topical Cream 1 Application(s) Topical two times a day PRN pain  sodium chloride 0.65% Nasal 1 Spray(s) Both Nostrils four times a day PRN Nasal Congestion    Pertinent Labs:         Skin: no breakdown noted    Nutrition focused physical exam conducted - found signs of malnutrition [ ]absent [x ]present    Subcutaneous fat loss: x[ ] Orbital fat pads region, [x ]Buccal fat region, [ ]Triceps region,  [ ]Ribs region    Muscle wasting: [ x]Temples region, [ x]Clavicle region, [ ]Shoulder region, [ ]Scapula region, [ ]Interosseous region,  [ ]thigh region, [ ]Calf region    Estimated Needs:   [x ] no change since previous assessment  [ ] recalculated:     Current Nutrition Diagnosis: Malnutrition...  moderate chronic  related to inadequate protein calorie intake in the setting of undomiciled, delirium  as evidenced by  6% weight loss, and physical findings.      Recommendations:   1) Add ensure TID  2) Daily weight  3) RX MVI, Vit C  Monitoring and Evaluation:   [ ] PO intake [ ] Tolerance to diet prescription [X] Weights  [X] Follow up per protocol [X] Labs:

## 2023-08-11 NOTE — CONSULT NOTE ADULT - ASSESSMENT
82F presenting with L epistaxis, initially with rapid rhino now replaced after further bleeding. 
82y Female with history of arthritis, ?dementia BIBEMS from department of  office for evaluation due to concerns for not being able to care for herself. GI consulted for microcytic anemia.

## 2023-08-11 NOTE — PROGRESS NOTE ADULT - SUBJECTIVE AND OBJECTIVE BOX
[facility] Division of Medicine    Chief Complaint:      SUBJECTIVE / OVERNIGHT EVENTS: Pt seen at the bedside. States they are feeling improved from day prior.  Patient denies chest pain, SOB, abd pain, N/V, fever, chills, dysuria or any other complaints. All remainder ROS negative.     MEDICATIONS  (STANDING):  amLODIPine   Tablet 5 milliGRAM(s) Oral daily  chlorhexidine 2% Cloths 1 Application(s) Topical daily  ferrous    sulfate 325 milliGRAM(s) Oral daily  pantoprazole  Injectable 40 milliGRAM(s) IV Push two times a day    MEDICATIONS  (PRN):  acetaminophen     Tablet .. 650 milliGRAM(s) Oral every 6 hours PRN Mild Pain (1 - 3), Moderate Pain (4 - 6)  methyl salicylate 15%/menthol 10% Topical Cream 1 Application(s) Topical two times a day PRN pain  sodium chloride 0.65% Nasal 1 Spray(s) Both Nostrils four times a day PRN Nasal Congestion      I&O's Summary    10 Aug 2023 07:01  -  11 Aug 2023 07:00  --------------------------------------------------------  IN: 720 mL / OUT: 0 mL / NET: 720 mL        PHYSICAL EXAM:  Vital Signs Last 24 Hrs  T(C): 36.6 (11 Aug 2023 11:28), Max: 36.8 (10 Aug 2023 16:00)  T(F): 97.9 (11 Aug 2023 11:28), Max: 98.2 (10 Aug 2023 16:00)  HR: 82 (11 Aug 2023 11:28) (79 - 103)  BP: 165/88 (11 Aug 2023 11:28) (152/87 - 165/88)  BP(mean): --  RR: 18 (11 Aug 2023 11:28) (18 - 18)  SpO2: 98% (11 Aug 2023 11:28) (94% - 98%)    Parameters below as of 11 Aug 2023 11:28  Patient On (Oxygen Delivery Method): room air          GENERAL:  Well-appearing, not in acute distress  EYES:  Clear conjunctiva, extraocular movement intact  ENT: Moist mucous membranes  RESP:  Non-labored breathing pattern, lungs clear to ausculation   CV: Regular rate and rhythm, no murmurs appreciated, no lower extremity edema  GI: Soft, non-tender, non-distended  NEURO: Awake, alert, conversant, upper and lower extremity strength 5/5, light touch sensation grossly intact  PSYCH: Calm, cooperative  SKIN: No rash or lesions, warm and dry    CONSTITUTIONAL: NAD  HEENT: PERRL, EOMI, moist oral mucosa, no pharyngeal injection or exudates, no cervica LAD  RESPIRATORY: Normal respiratory effort; lungs are clear to auscultation bilaterally  CARDIOVASCULAR: Regular rate and rhythm, normal S1 and S2. No lower extremity edema; Peripheral pulses are 2+ bilaterally  ABDOMEN: Nontender to palpation, normoactive bowel sounds, no rebound/guarding;  MUSKULOSKELETAL:  no joint swelling or tenderness to palpation, ROM intact in bilateral UE and LE  PSYCH: A+O to person, place, and time; affect appropriate  NEUROLOGY: CN 2-12 are intact and symmetric; no gross sensory deficits;   SKIN: No rashes    LABS:                    CAPILLARY BLOOD GLUCOSE          IMAGING:                                   [facility] Division of Medicine    Chief Complaint:  Anemia    SUBJECTIVE / OVERNIGHT EVENTS: Pt seen at the bedside. States she feels ok but told me she had a dream last night of her  ex- and is feeling a little depressed today. Denies SI or HI. Patient denies chest pain, SOB, abd pain, N/V, fever, chills, dysuria or any other complaints. All remainder ROS negative.     MEDICATIONS  (STANDING):  amLODIPine   Tablet 5 milliGRAM(s) Oral daily  chlorhexidine 2% Cloths 1 Application(s) Topical daily  ferrous    sulfate 325 milliGRAM(s) Oral daily  pantoprazole  Injectable 40 milliGRAM(s) IV Push two times a day    MEDICATIONS  (PRN):  acetaminophen     Tablet .. 650 milliGRAM(s) Oral every 6 hours PRN Mild Pain (1 - 3), Moderate Pain (4 - 6)  methyl salicylate 15%/menthol 10% Topical Cream 1 Application(s) Topical two times a day PRN pain  sodium chloride 0.65% Nasal 1 Spray(s) Both Nostrils four times a day PRN Nasal Congestion      I&O's Summary    10 Aug 2023 07:01  -  11 Aug 2023 07:00  --------------------------------------------------------  IN: 720 mL / OUT: 0 mL / NET: 720 mL        PHYSICAL EXAM:  Vital Signs Last 24 Hrs  T(C): 36.6 (11 Aug 2023 11:28), Max: 36.8 (10 Aug 2023 16:00)  T(F): 97.9 (11 Aug 2023 11:), Max: 98.2 (10 Aug 2023 16:00)  HR: 82 (11 Aug 2023 11:) (79 - 103)  BP: 165/88 (11 Aug 2023 11:28) (152/87 - 165/88)  BP(mean): --  RR: 18 (11 Aug 2023 11:) (18 - 18)  SpO2: 98% (11 Aug 2023 11:) (94% - 98%)    Parameters below as of 11 Aug 2023 11:28  Patient On (Oxygen Delivery Method): room air    CONSTITUTIONAL: NAD  RESPIRATORY: Normal respiratory effort; lungs are clear to auscultation bilaterally  CARDIOVASCULAR: Regular rate and rhythm, normal S1 and S2. No lower extremity edema; Peripheral pulses are 2+ bilaterally  ABDOMEN: Nontender to palpation, non distended  PSYCH: A+O to person, place, and time; affect appropriate  SKIN: No rashes or bruisesrashes

## 2023-08-11 NOTE — CONSULT NOTE ADULT - SUBJECTIVE AND OBJECTIVE BOX
CC: epistaxis    HPI: Patient is an 82F with significant PMH of arthritis, ?dementia, BIBEMS from department of  for evaluation after concern that she is not able to care for herself. Patient has reportedly been in shelter for the past two weeks. Admitted for anemia with suspected GI bleeding. Overnight, patient reports to have had an episode of epistaxis from her L nare unprovoked. She denies taking AC and no trauma to area. Medicine team placed rapid rhino, ENT consulted for further management.     Once at beside, rapid rhino appeared to be coming out of nose with bleeding around it. Removed rapid rhino with some oozing and replaced with new rapid rhino (10cc of air placed). Hemostasis achieved     PAST MEDICAL & SURGICAL HISTORY:  Arthritis      No significant past surgical history        Allergies    Allergy Status Unknown    Intolerances      MEDICATIONS  (STANDING):  amLODIPine   Tablet 5 milliGRAM(s) Oral daily  chlorhexidine 2% Cloths 1 Application(s) Topical daily  ferrous    sulfate 325 milliGRAM(s) Oral daily  pantoprazole  Injectable 40 milliGRAM(s) IV Push two times a day    MEDICATIONS  (PRN):  acetaminophen     Tablet .. 650 milliGRAM(s) Oral every 6 hours PRN Mild Pain (1 - 3), Moderate Pain (4 - 6)  methyl salicylate 15%/menthol 10% Topical Cream 1 Application(s) Topical two times a day PRN pain  sodium chloride 0.65% Nasal 1 Spray(s) Both Nostrils four times a day PRN Nasal Congestion      ROS:   ENT: all negative except as noted in HPI   CV: denies palpitations  Pulm: denies SOB, cough, hemoptysis  GI: denies change in apetite, indigestion, n/v  : denies pertinent urinary symptoms, urgency  Neuro: denies numbness/tingling, loss of sensation  Psych: denies anxiety  MS: denies muscle weakness, instability  Heme: denies easy bruising or bleeding  Endo: denies heat/cold intolerance, excessive sweating  Vascular: denies LE edema    Vital Signs Last 24 Hrs  T(C): 36.3 (11 Aug 2023 15:38), Max: 36.6 (11 Aug 2023 11:28)  T(F): 97.3 (11 Aug 2023 15:38), Max: 97.9 (11 Aug 2023 11:28)  HR: 75 (11 Aug 2023 15:38) (75 - 82)  BP: 155/75 (11 Aug 2023 15:38) (152/87 - 165/88)  BP(mean): --  RR: 18 (11 Aug 2023 15:38) (18 - 18)  SpO2: 96% (11 Aug 2023 15:38) (96% - 98%)    Parameters below as of 11 Aug 2023 15:38  Patient On (Oxygen Delivery Method): room air                              9.6    4.43  )-----------( 218      ( 11 Aug 2023 16:04 )             33.9         PT/INR - ( 11 Aug 2023 18:45 )   PT: 10.9 sec;   INR: 0.98 ratio         PTT - ( 11 Aug 2023 18:45 )  PTT:33.4 sec    PHYSICAL EXAM:  Gen: NAD, disheveled   Skin: No rashes, bruises, or lesions  Head: Normocephalic, Atraumatic  Face: no edema, erythema, or fluctuance. Parotid glands soft without mass  Eyes: no scleral injection  Ears: external exam unremarkable  Nose: R nare clean without discharge, L rapid rhino in place with bleeding around it; dislodged apon arrival   Mouth: No Stridor / Drooling / Trismus.  Mucosa moist, tongue/uvula midline, oropharynx with some dried blood posteriorly   Neck: Flat, supple, no lymphadenopathy, trachea midline, no masses  Lymphatic: No lymphadenopathy  Resp: breathing easily, no stridor  CV: no peripheral edema/cyanosis  GI: nondistended   Peripheral vascular: no JVD or edema  Neuro: facial nerve intact, no facial droop

## 2023-08-12 PROCEDURE — 99232 SBSQ HOSP IP/OBS MODERATE 35: CPT

## 2023-08-12 RX ADMIN — CHLORHEXIDINE GLUCONATE 1 APPLICATION(S): 213 SOLUTION TOPICAL at 18:11

## 2023-08-12 RX ADMIN — AMLODIPINE BESYLATE 5 MILLIGRAM(S): 2.5 TABLET ORAL at 05:08

## 2023-08-12 RX ADMIN — Medication 325 MILLIGRAM(S): at 18:07

## 2023-08-12 RX ADMIN — PANTOPRAZOLE SODIUM 40 MILLIGRAM(S): 20 TABLET, DELAYED RELEASE ORAL at 05:08

## 2023-08-12 RX ADMIN — PANTOPRAZOLE SODIUM 40 MILLIGRAM(S): 20 TABLET, DELAYED RELEASE ORAL at 18:11

## 2023-08-12 NOTE — PROGRESS NOTE ADULT - SUBJECTIVE AND OBJECTIVE BOX
Patient seen and examined at bedside.     Vitals:    Labs:    Exam:  Gen: pt lying in bed, alert, in NAD  Resp: unlabored  CVS: RRR  Abd: soft, NT, ND  Ext: moving all extremities spontaneously, sensation intact, pulses 2+   Patient seen and examined at bedside.     MEDICATIONS  (STANDING):  amLODIPine   Tablet 5 milliGRAM(s) Oral daily  chlorhexidine 2% Cloths 1 Application(s) Topical daily  ferrous    sulfate 325 milliGRAM(s) Oral daily  pantoprazole  Injectable 40 milliGRAM(s) IV Push two times a day    MEDICATIONS  (PRN):  acetaminophen     Tablet .. 650 milliGRAM(s) Oral every 6 hours PRN Mild Pain (1 - 3), Moderate Pain (4 - 6)  methyl salicylate 15%/menthol 10% Topical Cream 1 Application(s) Topical two times a day PRN pain  sodium chloride 0.65% Nasal 1 Spray(s) Both Nostrils four times a day PRN Nasal Congestion    Vital Signs Last 24 Hrs  T(C): 36.8 (12 Aug 2023 16:09), Max: 36.8 (12 Aug 2023 16:09)  T(F): 98.2 (12 Aug 2023 16:09), Max: 98.2 (12 Aug 2023 16:09)  HR: 92 (12 Aug 2023 16:09) (82 - 92)  BP: 146/76 (12 Aug 2023 16:09) (126/78 - 146/76)  BP(mean): --  RR: 18 (12 Aug 2023 16:09) (18 - 18)  SpO2: 98% (12 Aug 2023 16:09) (97% - 98%)    Parameters below as of 12 Aug 2023 16:09  Patient On (Oxygen Delivery Method): room air  O2 Flow (L/min): 26                          9.6    4.43  )-----------( 218      ( 11 Aug 2023 16:04 )             33.9       Exam:  Gen: pt lying in bed, alert, in NAD  Resp: unlabored  CVS: RRR  Abd: soft, NT, ND  Ext: moving all extremities spontaneously, sensation intact, pulses 2+

## 2023-08-12 NOTE — PROGRESS NOTE ADULT - ATTENDING COMMENTS
Pt seen and evaluated.  L pack remains in place.  Pt with no nasal bleeding.      Will plan on pack removal tomorrow.

## 2023-08-12 NOTE — PROGRESS NOTE ADULT - SUBJECTIVE AND OBJECTIVE BOX
[facility] Division of Hospital Medicine    Chief Complaint:  anemia    SUBJECTIVE / OVERNIGHT EVENTS:    Patient denies any complaints today. no more bleeding noticed from left nares. rhino rocket in place.    MEDICATIONS  (STANDING):  amLODIPine   Tablet 5 milliGRAM(s) Oral daily  chlorhexidine 2% Cloths 1 Application(s) Topical daily  ferrous    sulfate 325 milliGRAM(s) Oral daily  pantoprazole  Injectable 40 milliGRAM(s) IV Push two times a day    MEDICATIONS  (PRN):  acetaminophen     Tablet .. 650 milliGRAM(s) Oral every 6 hours PRN Mild Pain (1 - 3), Moderate Pain (4 - 6)  methyl salicylate 15%/menthol 10% Topical Cream 1 Application(s) Topical two times a day PRN pain  sodium chloride 0.65% Nasal 1 Spray(s) Both Nostrils four times a day PRN Nasal Congestion        I&O's Summary    11 Aug 2023 07:01  -  12 Aug 2023 07:00  --------------------------------------------------------  IN: 720 mL / OUT: 0 mL / NET: 720 mL        PHYSICAL EXAM:  Vital Signs Last 24 Hrs  T(C): 36.8 (12 Aug 2023 16:09), Max: 36.8 (12 Aug 2023 16:09)  T(F): 98.2 (12 Aug 2023 16:09), Max: 98.2 (12 Aug 2023 16:09)  HR: 92 (12 Aug 2023 16:09) (82 - 92)  BP: 146/76 (12 Aug 2023 16:09) (126/78 - 146/76)  BP(mean): --  RR: 18 (12 Aug 2023 16:09) (18 - 18)  SpO2: 98% (12 Aug 2023 16:09) (97% - 98%)    Parameters below as of 12 Aug 2023 16:09  Patient On (Oxygen Delivery Method): room air  O2 Flow (L/min): 26          CONSTITUTIONAL: NAD, well-developed, well-groomed  ENMT: normocephalic, atraumatic  RESPIRATORY: Normal respiratory effort; lungs are clear to auscultation bilaterally  CARDIOVASCULAR: Regular rate and rhythm, normal S1 and S2, no murmur/rub/gallop; No lower extremity edema; Peripheral pulses are 2+ bilaterally  MUSCLOSKELETAL:  No edema  PSYCH: A+O 2. she says she does not keep up with month and years any more. answering questions appropriately.  NEUROLOGY: CN 2-12 are intact and symmetric; no gross sensory deficits;   SKIN: No rashes; no palpable lesions    LABS:                        9.6    4.43  )-----------( 218      ( 11 Aug 2023 16:04 )             33.9           PT/INR - ( 11 Aug 2023 18:45 )   PT: 10.9 sec;   INR: 0.98 ratio         PTT - ( 11 Aug 2023 18:45 )  PTT:33.4 sec          CAPILLARY BLOOD GLUCOSE            RADIOLOGY & ADDITIONAL TESTS:  Results Reviewed:   Imaging Personally Reviewed:  Electrocardiogram Personally Reviewed:

## 2023-08-12 NOTE — PROGRESS NOTE ADULT - ASSESSMENT
Assessnebt:  82F presenting with L epistaxis, initially with rapid rhino now replaced after further bleeding.      - Rapid rhino replaced with another 5.5cm rapid rhino in left nare, no further bleeding noted. Will be reassessed daily by team, can likely remove Monday if still does not have bleeding  - Saline spray 2 sprays TID   - No heavy lifting, no bending below the waist and no noseblowing.  - ENT will continue to follow   Assessment:  82F presenting with L epistaxis, initially with rapid rhino now replaced after further bleeding.      - Rapid rhino replaced with another 5.5cm rapid rhino in left nare, no further bleeding noted. Will be evaluated daily by team.   -If continues to be dry, can remove Monday, patient is aware   - Saline spray 2 sprays TID   - No heavy lifting, no bending below the waist and no noseblowing.  - ENT will continue to follow

## 2023-08-12 NOTE — PROGRESS NOTE ADULT - ASSESSMENT
83 y/o Female with history of arthritis, dementia  ? BIBEMS from department of  office for evaluation due to concerns for not being able to care for herself. Patient has been in shelter for the past 2 weeks due to homelessness after being evicted from her previous home with her son. Admitted for anemia with suspected GI bleed    1. Iron deficient Anemia  - s/p 1 U PRBC, now Hgb stable    s/p IV Venofer 300 mg. now on ferrous sulfate 325.   Patient refused GI work up.    2. Dementia   patient at her baseline mental status    3. Arthritis    c/w prn tylenol for pain.    4. HTN  BP controlled. c/w amlodipine.     5. Epistaxis: left nares spontaneous bleeding. now s/p rhino rocket placement. ENT following and hoping to keep the rhino rocket in till Monday.    DVT: SCDs  Dispo: patient homeless so unclear where she will be discharge.

## 2023-08-13 LAB
HCT VFR BLD CALC: 30 % — LOW (ref 34.5–45)
HGB BLD-MCNC: 8.6 G/DL — LOW (ref 11.5–15.5)
MCHC RBC-ENTMCNC: 21.2 PG — LOW (ref 27–34)
MCHC RBC-ENTMCNC: 28.7 GM/DL — LOW (ref 32–36)
MCV RBC AUTO: 74.1 FL — LOW (ref 80–100)
PLATELET # BLD AUTO: 219 K/UL — SIGNIFICANT CHANGE UP (ref 150–400)
RBC # BLD: 4.05 M/UL — SIGNIFICANT CHANGE UP (ref 3.8–5.2)
RBC # FLD: 28.8 % — HIGH (ref 10.3–14.5)
WBC # BLD: 4.08 K/UL — SIGNIFICANT CHANGE UP (ref 3.8–10.5)
WBC # FLD AUTO: 4.08 K/UL — SIGNIFICANT CHANGE UP (ref 3.8–10.5)

## 2023-08-13 PROCEDURE — 99231 SBSQ HOSP IP/OBS SF/LOW 25: CPT | Mod: GC

## 2023-08-13 PROCEDURE — 99231 SBSQ HOSP IP/OBS SF/LOW 25: CPT

## 2023-08-13 RX ADMIN — AMLODIPINE BESYLATE 5 MILLIGRAM(S): 2.5 TABLET ORAL at 06:18

## 2023-08-13 RX ADMIN — PANTOPRAZOLE SODIUM 40 MILLIGRAM(S): 20 TABLET, DELAYED RELEASE ORAL at 06:18

## 2023-08-13 RX ADMIN — CHLORHEXIDINE GLUCONATE 1 APPLICATION(S): 213 SOLUTION TOPICAL at 15:23

## 2023-08-13 RX ADMIN — Medication 325 MILLIGRAM(S): at 15:22

## 2023-08-13 NOTE — PROGRESS NOTE ADULT - SUBJECTIVE AND OBJECTIVE BOX
[facility] Division of Hospital Medicine    Chief Complaint:      SUBJECTIVE / OVERNIGHT EVENTS:    Patient denies any complaints     MEDICATIONS  (STANDING):  amLODIPine   Tablet 5 milliGRAM(s) Oral daily  chlorhexidine 2% Cloths 1 Application(s) Topical daily  ferrous    sulfate 325 milliGRAM(s) Oral daily  pantoprazole  Injectable 40 milliGRAM(s) IV Push two times a day    MEDICATIONS  (PRN):  acetaminophen     Tablet .. 650 milliGRAM(s) Oral every 6 hours PRN Mild Pain (1 - 3), Moderate Pain (4 - 6)  methyl salicylate 15%/menthol 10% Topical Cream 1 Application(s) Topical two times a day PRN pain  sodium chloride 0.65% Nasal 1 Spray(s) Both Nostrils four times a day PRN Nasal Congestion        I&O's Summary      PHYSICAL EXAM:  Vital Signs Last 24 Hrs  T(C): 37 (13 Aug 2023 11:34), Max: 37 (13 Aug 2023 11:34)  T(F): 98.6 (13 Aug 2023 11:34), Max: 98.6 (13 Aug 2023 11:34)  HR: 84 (13 Aug 2023 11:34) (84 - 92)  BP: 137/68 (13 Aug 2023 11:34) (137/68 - 150/84)  BP(mean): --  RR: 18 (13 Aug 2023 11:34) (18 - 18)  SpO2: 99% (13 Aug 2023 11:34) (98% - 99%)    Parameters below as of 13 Aug 2023 11:34  Patient On (Oxygen Delivery Method): room air            CONSTITUTIONAL: NAD  ENMT: left nares has rhino rocket in place  RESPIRATORY: Normal respiratory effort; lungs are clear to auscultation bilaterally  CARDIOVASCULAR: Regular rate and rhythm, normal S1 and S2, no murmur/rub/gallop; No lower extremity edema.  ABDOMEN: Nontender to palpation, normoactive bowel sounds, no rebound/guarding; No hepatosplenomegaly  MUSCLOSKELETAL:  No edema  PSYCH: A+O to person and place; affect appropriate  NEUROLOGY: CN 2-12 are intact and symmetric; no gross sensory deficits;   SKIN: No rashes; no palpable lesions    LABS:                        8.6    4.08  )-----------( 219      ( 13 Aug 2023 06:23 )             30.0           PT/INR - ( 11 Aug 2023 18:45 )   PT: 10.9 sec;   INR: 0.98 ratio         PTT - ( 11 Aug 2023 18:45 )  PTT:33.4 sec          CAPILLARY BLOOD GLUCOSE            RADIOLOGY & ADDITIONAL TESTS:  Results Reviewed:   Imaging Personally Reviewed:  Electrocardiogram Personally Reviewed:

## 2023-08-13 NOTE — PROGRESS NOTE ADULT - ASSESSMENT
83 y/o Female with history of arthritis, dementia  ? BIBEMS from department of  office for evaluation due to concerns for not being able to care for herself. Patient has been in shelter for the past 2 weeks due to homelessness after being evicted from her previous home with her son. Admitted for anemia with suspected GI bleed    1. Epistaxis: left nares spontaneous bleeding. resolved now. still has rhino rocket in place. ENT following and hoping to remove rhino rocket tomorrow.    2. Iron deficient Anemia  - s/p 1 U PRBC, Hgb is stable. yesterday's Hb appears out of line with rest of the Hb numbers.    s/p IV Venofer 300 mg. now on ferrous sulfate 325.   Patient refused GI work up.    3. Arthritis    c/w prn tylenol for pain.    4. HTN  BP controlled. c/w amlodipine.     5. Dementia   patient at her baseline mental status    DVT: SCDs  Dispo: patient homeless so unclear where she will be discharge.

## 2023-08-14 PROCEDURE — 99232 SBSQ HOSP IP/OBS MODERATE 35: CPT

## 2023-08-14 PROCEDURE — 99231 SBSQ HOSP IP/OBS SF/LOW 25: CPT

## 2023-08-14 RX ADMIN — CHLORHEXIDINE GLUCONATE 1 APPLICATION(S): 213 SOLUTION TOPICAL at 07:41

## 2023-08-14 RX ADMIN — Medication 325 MILLIGRAM(S): at 17:14

## 2023-08-14 RX ADMIN — PANTOPRAZOLE SODIUM 40 MILLIGRAM(S): 20 TABLET, DELAYED RELEASE ORAL at 17:17

## 2023-08-14 RX ADMIN — AMLODIPINE BESYLATE 5 MILLIGRAM(S): 2.5 TABLET ORAL at 06:20

## 2023-08-14 NOTE — PROGRESS NOTE ADULT - SUBJECTIVE AND OBJECTIVE BOX
ENT ISSUE/POD: epistaxis    HPI: Pt feels well. Has not had bleeding since packing placed       PAST MEDICAL & SURGICAL HISTORY:  Arthritis      No significant past surgical history        Allergies    Allergy Status Unknown    Intolerances      MEDICATIONS  (STANDING):  amLODIPine   Tablet 5 milliGRAM(s) Oral daily  chlorhexidine 2% Cloths 1 Application(s) Topical daily  ferrous    sulfate 325 milliGRAM(s) Oral daily  pantoprazole  Injectable 40 milliGRAM(s) IV Push two times a day    MEDICATIONS  (PRN):  acetaminophen     Tablet .. 650 milliGRAM(s) Oral every 6 hours PRN Mild Pain (1 - 3), Moderate Pain (4 - 6)  methyl salicylate 15%/menthol 10% Topical Cream 1 Application(s) Topical two times a day PRN pain  sodium chloride 0.65% Nasal 1 Spray(s) Both Nostrils four times a day PRN Nasal Congestion      ROS:   ENT: all negative except as noted in HPI   Pulm: denies SOB, cough, hemoptysis  Neuro: denies numbness/tingling, loss of sensation  Endo: denies heat/cold intolerance, excessive sweating      Vital Signs Last 24 Hrs  T(C): 36.7 (14 Aug 2023 11:01), Max: 37.1 (13 Aug 2023 21:50)  T(F): 98 (14 Aug 2023 11:01), Max: 98.7 (13 Aug 2023 21:50)  HR: 86 (14 Aug 2023 11:01) (76 - 91)  BP: 147/78 (14 Aug 2023 11:01) (126/70 - 147/78)  BP(mean): --  RR: 18 (14 Aug 2023 11:01) (18 - 18)  SpO2: 99% (14 Aug 2023 11:01) (96% - 100%)    Parameters below as of 14 Aug 2023 11:01  Patient On (Oxygen Delivery Method): room air                              8.6    4.08  )-----------( 219      ( 13 Aug 2023 06:23 )             30.0             PHYSICAL EXAM:  Gen: NAD  Skin: No rashes, bruises, or lesions  Head: Normocephalic, Atraumatic  Face: no edema, erythema, or fluctuance. Parotid glands soft without mass  Eyes: no scleral injection  Nose: L nare packed with inflated rapid rhino. No bleeding b/l   Mouth: No Stridor / Drooling / Trismus.  Mucosa moist, tongue/uvula midline, oropharynx clear of blood and clots  Neck: Flat, supple, no lymphadenopathy, trachea midline, no masses  Lymphatic: No lymphadenopathy  Resp: breathing easily, no stridor  Neuro: facial nerve intact, no facial droop

## 2023-08-14 NOTE — PROGRESS NOTE ADULT - NUTRITIONAL ASSESSMENT
This patient has been assessed with a concern for Malnutrition and has been determined to have a diagnosis/diagnoses of Moderate protein-calorie malnutrition.    This patient is being managed with:   Diet Regular-  Supplement Feeding Modality:  Oral  Ensure Enlive Cans or Servings Per Day:  1       Frequency:  Three Times a day  Entered: Aug  3 2023  3:35PM    Diet Regular-  Entered: Jul 28 2023  8:50AM    The following pending diet order is being considered for treatment of Moderate protein-calorie malnutrition:null
This patient has been assessed with a concern for Malnutrition and has been determined to have a diagnosis/diagnoses of Moderate protein-calorie malnutrition.    This patient is being managed with:   Diet Regular-  Supplement Feeding Modality:  Oral  Ensure Enlive Cans or Servings Per Day:  1       Frequency:  Three Times a day  Entered: Aug  3 2023  3:35PM    Diet Regular-  Entered: Jul 28 2023  8:50AM    The following pending diet order is being considered for treatment of Moderate protein-calorie malnutrition:null
This patient has been assessed with a concern for Malnutrition and has been determined to have a diagnosis/diagnoses of Moderate protein-calorie malnutrition.    This patient is being managed with:   Diet Regular-  Supplement Feeding Modality:  Oral  Ensure Enlive Cans or Servings Per Day:  1       Frequency:  Three Times a day  Entered: Aug 10 2023  5:06PM    Diet Regular-  Supplement Feeding Modality:  Oral  Ensure Enlive Cans or Servings Per Day:  1       Frequency:  Three Times a day  Entered: Aug  3 2023  3:35PM    Diet Regular-  Entered: Jul 28 2023  8:50AM    The following pending diet order is being considered for treatment of Moderate protein-calorie malnutrition:null
This patient has been assessed with a concern for Malnutrition and has been determined to have a diagnosis/diagnoses of Moderate protein-calorie malnutrition.    This patient is being managed with:   Diet Regular-  Supplement Feeding Modality:  Oral  Ensure Enlive Cans or Servings Per Day:  1       Frequency:  Three Times a day  Entered: Aug  3 2023  3:35PM    Diet Regular-  Entered: Jul 28 2023  8:50AM    The following pending diet order is being considered for treatment of Moderate protein-calorie malnutrition:null
This patient has been assessed with a concern for Malnutrition and has been determined to have a diagnosis/diagnoses of Moderate protein-calorie malnutrition.    This patient is being managed with:   Diet Regular-  Supplement Feeding Modality:  Oral  Ensure Enlive Cans or Servings Per Day:  1       Frequency:  Three Times a day  Entered: Aug 10 2023  5:06PM    Diet Regular-  Supplement Feeding Modality:  Oral  Ensure Enlive Cans or Servings Per Day:  1       Frequency:  Three Times a day  Entered: Aug  3 2023  3:35PM    Diet Regular-  Entered: Jul 28 2023  8:50AM    The following pending diet order is being considered for treatment of Moderate protein-calorie malnutrition:null
This patient has been assessed with a concern for Malnutrition and has been determined to have a diagnosis/diagnoses of Moderate protein-calorie malnutrition.    This patient is being managed with:   Diet Regular-  Supplement Feeding Modality:  Oral  Ensure Enlive Cans or Servings Per Day:  1       Frequency:  Three Times a day  Entered: Aug  3 2023  3:35PM    Diet Regular-  Entered: Jul 28 2023  8:50AM    The following pending diet order is being considered for treatment of Moderate protein-calorie malnutrition:null
This patient has been assessed with a concern for Malnutrition and has been determined to have a diagnosis/diagnoses of Moderate protein-calorie malnutrition.    This patient is being managed with:   Diet Regular-  Supplement Feeding Modality:  Oral  Ensure Enlive Cans or Servings Per Day:  1       Frequency:  Three Times a day  Entered: Aug 10 2023  5:06PM    Diet Regular-  Supplement Feeding Modality:  Oral  Ensure Enlive Cans or Servings Per Day:  1       Frequency:  Three Times a day  Entered: Aug  3 2023  3:35PM    Diet Regular-  Entered: Jul 28 2023  8:50AM    The following pending diet order is being considered for treatment of Moderate protein-calorie malnutrition:null

## 2023-08-14 NOTE — PROGRESS NOTE ADULT - SUBJECTIVE AND OBJECTIVE BOX
[facility] Division of Hospital Medicine    Chief Complaint:      SUBJECTIVE / OVERNIGHT EVENTS: Patient denies any complaints.       MEDICATIONS  (STANDING):  amLODIPine   Tablet 5 milliGRAM(s) Oral daily  chlorhexidine 2% Cloths 1 Application(s) Topical daily  ferrous    sulfate 325 milliGRAM(s) Oral daily  pantoprazole  Injectable 40 milliGRAM(s) IV Push two times a day    MEDICATIONS  (PRN):  acetaminophen     Tablet .. 650 milliGRAM(s) Oral every 6 hours PRN Mild Pain (1 - 3), Moderate Pain (4 - 6)  methyl salicylate 15%/menthol 10% Topical Cream 1 Application(s) Topical two times a day PRN pain  sodium chloride 0.65% Nasal 1 Spray(s) Both Nostrils four times a day PRN Nasal Congestion        I&O's Summary    13 Aug 2023 07:01  -  14 Aug 2023 07:00  --------------------------------------------------------  IN: 450 mL / OUT: 0 mL / NET: 450 mL        PHYSICAL EXAM:  Vital Signs Last 24 Hrs  T(C): 36.7 (14 Aug 2023 11:01), Max: 37.1 (13 Aug 2023 21:50)  T(F): 98 (14 Aug 2023 11:01), Max: 98.7 (13 Aug 2023 21:50)  HR: 86 (14 Aug 2023 11:01) (76 - 91)  BP: 147/78 (14 Aug 2023 11:01) (126/70 - 147/78)  BP(mean): --  RR: 18 (14 Aug 2023 11:01) (18 - 18)  SpO2: 99% (14 Aug 2023 11:01) (96% - 100%)    Parameters below as of 14 Aug 2023 11:01  Patient On (Oxygen Delivery Method): room air            CONSTITUTIONAL: NAD  ENMT: normocephalic, atrumatic  PSYCH: A+O to person, place  NEUROLOGY: CN 2-12 are intact and symmetric; no gross focal deficits;       LABS:                        8.6    4.08  )-----------( 219      ( 13 Aug 2023 06:23 )             30.0                     CAPILLARY BLOOD GLUCOSE            RADIOLOGY & ADDITIONAL TESTS:  Results Reviewed:   Imaging Personally Reviewed:  Electrocardiogram Personally Reviewed:

## 2023-08-14 NOTE — PROGRESS NOTE ADULT - ASSESSMENT
81 y/o Female with history of arthritis, dementia  ? BIBEMS from department of  office for evaluation due to concerns for not being able to care for herself. Patient has been in shelter for the past 2 weeks due to homelessness after being evicted from her previous home with her son. Admitted for anemia with suspected GI bleed    1. Epistaxis: left nares spontaneous bleeding. resolved now. still has rhino rocket in place. ENT following and hoping to remove rhino rocket today.    2. Iron deficient Anemia  - s/p 1 U PRBC, Hgb is stable. yesterday's Hb appears out of line with rest of the Hb numbers.    s/p IV Venofer 300 mg. c/w ferrous sulfate 325.   Patient refused GI work up.    3. Arthritis    c/w prn tylenol for pain.    4. HTN  BP controlled. c/w amlodipine.     5. Dementia   patient at her baseline mental status.    DVT: SCDs  Dispo: patient homeless so unclear where she will be discharge.

## 2023-08-15 VITALS
HEART RATE: 96 BPM | OXYGEN SATURATION: 96 % | SYSTOLIC BLOOD PRESSURE: 157 MMHG | RESPIRATION RATE: 20 BRPM | DIASTOLIC BLOOD PRESSURE: 83 MMHG | TEMPERATURE: 98 F

## 2023-08-15 PROCEDURE — 85730 THROMBOPLASTIN TIME PARTIAL: CPT

## 2023-08-15 PROCEDURE — 71045 X-RAY EXAM CHEST 1 VIEW: CPT

## 2023-08-15 PROCEDURE — 86901 BLOOD TYPING SEROLOGIC RH(D): CPT

## 2023-08-15 PROCEDURE — 84480 ASSAY TRIIODOTHYRONINE (T3): CPT

## 2023-08-15 PROCEDURE — 82272 OCCULT BLD FECES 1-3 TESTS: CPT

## 2023-08-15 PROCEDURE — 84436 ASSAY OF TOTAL THYROXINE: CPT

## 2023-08-15 PROCEDURE — 85610 PROTHROMBIN TIME: CPT

## 2023-08-15 PROCEDURE — 86900 BLOOD TYPING SEROLOGIC ABO: CPT

## 2023-08-15 PROCEDURE — 83540 ASSAY OF IRON: CPT

## 2023-08-15 PROCEDURE — 85025 COMPLETE CBC W/AUTO DIFF WBC: CPT

## 2023-08-15 PROCEDURE — 82607 VITAMIN B-12: CPT

## 2023-08-15 PROCEDURE — 82728 ASSAY OF FERRITIN: CPT

## 2023-08-15 PROCEDURE — 80048 BASIC METABOLIC PNL TOTAL CA: CPT

## 2023-08-15 PROCEDURE — 36430 TRANSFUSION BLD/BLD COMPNT: CPT

## 2023-08-15 PROCEDURE — 36415 COLL VENOUS BLD VENIPUNCTURE: CPT

## 2023-08-15 PROCEDURE — 84100 ASSAY OF PHOSPHORUS: CPT

## 2023-08-15 PROCEDURE — 83735 ASSAY OF MAGNESIUM: CPT

## 2023-08-15 PROCEDURE — 93005 ELECTROCARDIOGRAM TRACING: CPT

## 2023-08-15 PROCEDURE — 87641 MR-STAPH DNA AMP PROBE: CPT

## 2023-08-15 PROCEDURE — 84443 ASSAY THYROID STIM HORMONE: CPT

## 2023-08-15 PROCEDURE — 86923 COMPATIBILITY TEST ELECTRIC: CPT

## 2023-08-15 PROCEDURE — 87640 STAPH A DNA AMP PROBE: CPT

## 2023-08-15 PROCEDURE — 86850 RBC ANTIBODY SCREEN: CPT

## 2023-08-15 PROCEDURE — 84466 ASSAY OF TRANSFERRIN: CPT

## 2023-08-15 PROCEDURE — 85045 AUTOMATED RETICULOCYTE COUNT: CPT

## 2023-08-15 PROCEDURE — 99239 HOSP IP/OBS DSCHRG MGMT >30: CPT

## 2023-08-15 PROCEDURE — P9040: CPT

## 2023-08-15 PROCEDURE — 82746 ASSAY OF FOLIC ACID SERUM: CPT

## 2023-08-15 PROCEDURE — 83550 IRON BINDING TEST: CPT

## 2023-08-15 PROCEDURE — 85027 COMPLETE CBC AUTOMATED: CPT

## 2023-08-15 PROCEDURE — 97163 PT EVAL HIGH COMPLEX 45 MIN: CPT

## 2023-08-15 PROCEDURE — 99285 EMERGENCY DEPT VISIT HI MDM: CPT

## 2023-08-15 PROCEDURE — 87635 SARS-COV-2 COVID-19 AMP PRB: CPT

## 2023-08-15 PROCEDURE — 80053 COMPREHEN METABOLIC PANEL: CPT

## 2023-08-15 RX ORDER — AMLODIPINE BESYLATE 2.5 MG/1
1 TABLET ORAL
Qty: 0 | Refills: 0 | DISCHARGE
Start: 2023-08-15

## 2023-08-15 RX ORDER — MENTHOL AND METHYL SALICYLATE 10; 30 G/100G; G/100G
1 CREAM TOPICAL
Qty: 0 | Refills: 0 | DISCHARGE
Start: 2023-08-15

## 2023-08-15 RX ORDER — FERROUS SULFATE 325(65) MG
1 TABLET ORAL
Qty: 0 | Refills: 0 | DISCHARGE
Start: 2023-08-15

## 2023-08-15 RX ORDER — ACETAMINOPHEN 500 MG
2 TABLET ORAL
Qty: 0 | Refills: 0 | DISCHARGE
Start: 2023-08-15

## 2023-08-15 RX ADMIN — Medication 325 MILLIGRAM(S): at 12:20

## 2023-08-15 RX ADMIN — AMLODIPINE BESYLATE 5 MILLIGRAM(S): 2.5 TABLET ORAL at 04:59

## 2023-08-15 RX ADMIN — PANTOPRAZOLE SODIUM 40 MILLIGRAM(S): 20 TABLET, DELAYED RELEASE ORAL at 04:59

## 2023-08-15 NOTE — PROGRESS NOTE ADULT - SUBJECTIVE AND OBJECTIVE BOX
ENT ISSUE/POD: epistaxis    HPI: Pt with no bleeding since rapid rhino placed 8/11      PAST MEDICAL & SURGICAL HISTORY:  Arthritis      No significant past surgical history        Allergies    Allergy Status Unknown    Intolerances      MEDICATIONS  (STANDING):  amLODIPine   Tablet 5 milliGRAM(s) Oral daily  chlorhexidine 2% Cloths 1 Application(s) Topical daily  ferrous    sulfate 325 milliGRAM(s) Oral daily  pantoprazole  Injectable 40 milliGRAM(s) IV Push two times a day    MEDICATIONS  (PRN):  acetaminophen     Tablet .. 650 milliGRAM(s) Oral every 6 hours PRN Mild Pain (1 - 3), Moderate Pain (4 - 6)  methyl salicylate 15%/menthol 10% Topical Cream 1 Application(s) Topical two times a day PRN pain  sodium chloride 0.65% Nasal 1 Spray(s) Both Nostrils four times a day PRN Nasal Congestion      ROS:   ENT: all negative except as noted in HPI   Pulm: denies SOB, cough, hemoptysis  Neuro: denies numbness/tingling, loss of sensation  Endo: denies heat/cold intolerance, excessive sweating      Vital Signs Last 24 Hrs  T(C): 36.6 (15 Aug 2023 04:43), Max: 36.9 (14 Aug 2023 15:22)  T(F): 97.8 (15 Aug 2023 04:43), Max: 98.5 (14 Aug 2023 15:22)  HR: 78 (15 Aug 2023 04:43) (78 - 91)  BP: 144/82 (15 Aug 2023 04:43) (139/71 - 147/78)  BP(mean): --  RR: 18 (15 Aug 2023 04:43) (18 - 18)  SpO2: 97% (15 Aug 2023 04:43) (96% - 99%)    Parameters below as of 15 Aug 2023 04:43  Patient On (Oxygen Delivery Method): room air       PHYSICAL EXAM:  Gen: NAD  Skin: No rashes, bruises, or lesions  Head: Normocephalic, Atraumatic  Face: no edema, erythema, or fluctuance. Parotid glands soft without mass  Eyes: no scleral injection  Nose: R nare patent, L nare with rapid rhino. Air removed form packing around 8:00 am  Mouth: No Stridor / Drooling / Trismus.  Mucosa moist, tongue/uvula midline, oropharynx clear of blood and clots  Neck: Flat, supple, no lymphadenopathy, trachea midline, no masses  Lymphatic: No lymphadenopathy  Resp: breathing easily, no stridor  Neuro: facial nerve intact, no facial droop      10:30 AM: Packing removed from L nare without any bleeding

## 2023-08-15 NOTE — DISCHARGE NOTE PROVIDER - NSDCMRMEDTOKEN_GEN_ALL_CORE_FT
acetaminophen 325 mg oral tablet: 2 tab(s) orally every 6 hours As needed Mild Pain (1 - 3), Moderate Pain (4 - 6)  amLODIPine 5 mg oral tablet: 1 tab(s) orally once a day  ferrous sulfate 325 mg (65 mg elemental iron) oral tablet: 1 tab(s) orally once a day  methyl salicylate-menthol 15%-10% topical cream: 1 Apply topically to affected area 2 times a day As needed pain

## 2023-08-15 NOTE — PROGRESS NOTE ADULT - ASSESSMENT
84 yo F consulted on 8/11 for epistaxis controlled with a rapid rhino and no bleeding since. Packing now removed with no further bleeding

## 2023-08-15 NOTE — DISCHARGE NOTE PROVIDER - HOSPITAL COURSE
81 y/o Female with history of arthritis, dementia BIBEMS from department of  office for evaluation due to concerns for not being able to care for herself. Patient has been in shelter for the past 2 weeks due to homelessness after being evicted from her previous home with her son. Admitted for anemia work up    1. Iron deficient Anemia  - Patient was given 1 U PRBC. she did not have active GI bleed. GI was consulted for anemia work up but patient refused endoscopy. she was given IV Venofer 300 mg and then was continued on oral ferrous sulfate 325. Hb stable and was 8.6 on last check        2. Epistaxis: Patient started bleeding from left nares spontaneously. ENT consulted and rhino rochet was placed which was removed after 3 days later on 08/15/23 and bleeding has stopped.    3. Arthritis    c/w prn tylenol for pain.    4. HTN  BP controlled. c/w amlodipine.     5. Dementia   patient at her baseline mental status. she appears to have mild dementia

## 2023-08-15 NOTE — DISCHARGE NOTE PROVIDER - NSDCCPCAREPLAN_GEN_ALL_CORE_FT
PRINCIPAL DISCHARGE DIAGNOSIS  Diagnosis: Iron deficiency anemia due to chronic blood loss  Assessment and Plan of Treatment:

## 2023-08-15 NOTE — PROGRESS NOTE ADULT - REASON FOR ADMISSION
Anemia

## 2024-06-03 NOTE — PROGRESS NOTE ADULT - SUBJECTIVE AND OBJECTIVE BOX
I reviewed the H&P, I examined the patient, and there are no changes in the patient's condition.     [facility] Division of Hospital Medicine    Chief Complaint:      SUBJECTIVE / OVERNIGHT EVENTS:    Patient seen and examined at bedside, no acute events overnight, patient denies any new complaints. Now s/p 1 U PRBC, with Hgb improved to 7.8, however refusing iron transfusion with MCV of 68 and ferritin of 19, iron total 11. Patient also refusing colonoscopy still at this time, discussed with Psychiatry, will attempt to continue to persuade her for treatment options if Hgb again drops. However if Hgb continues to drop and unable to reach family or convince, may have to reach out to medical ethics.     MEDICATIONS  (STANDING):  pantoprazole  Injectable 40 milliGRAM(s) IV Push two times a day    MEDICATIONS  (PRN):  acetaminophen     Tablet .. 650 milliGRAM(s) Oral every 6 hours PRN Mild Pain (1 - 3), Moderate Pain (4 - 6)        I&O's Summary      PHYSICAL EXAM:  Vital Signs Last 24 Hrs  T(C): 36.8 (29 Jul 2023 15:26), Max: 36.8 (28 Jul 2023 22:35)  T(F): 98.3 (29 Jul 2023 15:26), Max: 98.3 (28 Jul 2023 22:35)  HR: 88 (29 Jul 2023 15:26) (75 - 88)  BP: 163/89 (29 Jul 2023 15:26) (133/75 - 167/84)  BP(mean): 114 (29 Jul 2023 15:26) (114 - 114)  RR: 18 (29 Jul 2023 15:26) (16 - 18)  SpO2: 98% (29 Jul 2023 15:26) (94% - 98%)    Parameters below as of 29 Jul 2023 15:26  Patient On (Oxygen Delivery Method): room air      CONSTITUTIONAL: NAD, Pleasant  ENT: poor oral dentition, many missing teeth   RESPIRATORY: Normal respiratory effort; lungs are clear to auscultation bilaterally  CARDIOVASCULAR: Regular rate and rhythm, normal S1 and S2, no murmur/rub/gallop; No lower extremity edema; Peripheral pulses are 2+ bilaterally  ABDOMEN: Nontender to palpation, normoactive bowel sounds, no rebound/guarding; No hepatosplenomegaly  MUSCLOSKELETAL:  Normal gait with assist device; no clubbing or cyanosis of digits; no joint swelling or tenderness to palpation  PSYCH: A+O to name, partially to place and date.  NEUROLOGY: CN 2-12 are intact and symmetric; no gross sensory deficits;   SKIN: No rashes; no palpable lesions      LABS:                        7.8    4.66  )-----------( 149      ( 29 Jul 2023 00:54 )             27.5     07-29    139  |  106  |  16.8  ----------------------------<  96  4.7   |  21.0<L>  |  0.69    Ca    8.6      29 Jul 2023 00:54  Phos  3.2     07-28  Mg     1.9     07-28      PT/INR - ( 27 Jul 2023 17:52 )   PT: 12.4 sec;   INR: 1.12 ratio         PTT - ( 27 Jul 2023 17:52 )  PTT:32.2 sec      Urinalysis Basic - ( 29 Jul 2023 00:54 )    Color: x / Appearance: x / SG: x / pH: x  Gluc: 96 mg/dL / Ketone: x  / Bili: x / Urobili: x   Blood: x / Protein: x / Nitrite: x   Leuk Esterase: x / RBC: x / WBC x   Sq Epi: x / Non Sq Epi: x / Bacteria: x        CAPILLARY BLOOD GLUCOSE            RADIOLOGY & ADDITIONAL TESTS:  Results Reviewed:   Imaging Personally Reviewed:  Electrocardiogram Personally Reviewed

## 2024-10-28 ENCOUNTER — EMERGENCY (EMERGENCY)
Facility: HOSPITAL | Age: 84
LOS: 1 days | Discharge: NURSING, REHAB OR LONG-TERM CARE | End: 2024-10-28
Attending: EMERGENCY MEDICINE | Admitting: EMERGENCY MEDICINE
Payer: MEDICARE

## 2024-10-28 VITALS
HEIGHT: 60 IN | OXYGEN SATURATION: 98 % | SYSTOLIC BLOOD PRESSURE: 155 MMHG | DIASTOLIC BLOOD PRESSURE: 87 MMHG | RESPIRATION RATE: 20 BRPM | HEART RATE: 90 BPM | TEMPERATURE: 98 F | WEIGHT: 119.93 LBS

## 2024-10-28 VITALS
TEMPERATURE: 98 F | SYSTOLIC BLOOD PRESSURE: 118 MMHG | HEART RATE: 88 BPM | OXYGEN SATURATION: 95 % | RESPIRATION RATE: 16 BRPM | DIASTOLIC BLOOD PRESSURE: 77 MMHG

## 2024-10-28 PROBLEM — M19.90 UNSPECIFIED OSTEOARTHRITIS, UNSPECIFIED SITE: Chronic | Status: ACTIVE | Noted: 2023-07-27

## 2024-10-28 LAB
ALBUMIN SERPL ELPH-MCNC: 4.2 G/DL — SIGNIFICANT CHANGE UP (ref 3.3–5)
ALP SERPL-CCNC: 114 U/L — SIGNIFICANT CHANGE UP (ref 30–120)
ALT FLD-CCNC: 14 U/L — SIGNIFICANT CHANGE UP (ref 10–60)
ANION GAP SERPL CALC-SCNC: 12 MMOL/L — SIGNIFICANT CHANGE UP (ref 5–17)
AST SERPL-CCNC: 22 U/L — SIGNIFICANT CHANGE UP (ref 10–40)
BASOPHILS # BLD AUTO: 0.03 K/UL — SIGNIFICANT CHANGE UP (ref 0–0.2)
BASOPHILS NFR BLD AUTO: 0.5 % — SIGNIFICANT CHANGE UP (ref 0–2)
BILIRUB SERPL-MCNC: 0.5 MG/DL — SIGNIFICANT CHANGE UP (ref 0.2–1.2)
BUN SERPL-MCNC: 43 MG/DL — HIGH (ref 7–23)
CALCIUM SERPL-MCNC: 10 MG/DL — SIGNIFICANT CHANGE UP (ref 8.4–10.5)
CHLORIDE SERPL-SCNC: 103 MMOL/L — SIGNIFICANT CHANGE UP (ref 96–108)
CO2 SERPL-SCNC: 23 MMOL/L — SIGNIFICANT CHANGE UP (ref 22–31)
CREAT SERPL-MCNC: 1.83 MG/DL — HIGH (ref 0.5–1.3)
EGFR: 27 ML/MIN/1.73M2 — LOW
EOSINOPHIL # BLD AUTO: 0.04 K/UL — SIGNIFICANT CHANGE UP (ref 0–0.5)
EOSINOPHIL NFR BLD AUTO: 0.6 % — SIGNIFICANT CHANGE UP (ref 0–6)
GLUCOSE SERPL-MCNC: 109 MG/DL — HIGH (ref 70–99)
HCT VFR BLD CALC: 35.5 % — SIGNIFICANT CHANGE UP (ref 34.5–45)
HGB BLD-MCNC: 11.7 G/DL — SIGNIFICANT CHANGE UP (ref 11.5–15.5)
IMM GRANULOCYTES NFR BLD AUTO: 0.2 % — SIGNIFICANT CHANGE UP (ref 0–0.9)
LYMPHOCYTES # BLD AUTO: 1.14 K/UL — SIGNIFICANT CHANGE UP (ref 1–3.3)
LYMPHOCYTES # BLD AUTO: 17.8 % — SIGNIFICANT CHANGE UP (ref 13–44)
MAGNESIUM SERPL-MCNC: 2.2 MG/DL — SIGNIFICANT CHANGE UP (ref 1.6–2.6)
MCHC RBC-ENTMCNC: 32.2 PG — SIGNIFICANT CHANGE UP (ref 27–34)
MCHC RBC-ENTMCNC: 33 G/DL — SIGNIFICANT CHANGE UP (ref 32–36)
MCV RBC AUTO: 97.8 FL — SIGNIFICANT CHANGE UP (ref 80–100)
MONOCYTES # BLD AUTO: 0.88 K/UL — SIGNIFICANT CHANGE UP (ref 0–0.9)
MONOCYTES NFR BLD AUTO: 13.7 % — SIGNIFICANT CHANGE UP (ref 2–14)
NEUTROPHILS # BLD AUTO: 4.32 K/UL — SIGNIFICANT CHANGE UP (ref 1.8–7.4)
NEUTROPHILS NFR BLD AUTO: 67.2 % — SIGNIFICANT CHANGE UP (ref 43–77)
NRBC # BLD: 0 /100 WBCS — SIGNIFICANT CHANGE UP (ref 0–0)
PLATELET # BLD AUTO: 229 K/UL — SIGNIFICANT CHANGE UP (ref 150–400)
POTASSIUM SERPL-MCNC: 5.2 MMOL/L — SIGNIFICANT CHANGE UP (ref 3.5–5.3)
POTASSIUM SERPL-SCNC: 5.2 MMOL/L — SIGNIFICANT CHANGE UP (ref 3.5–5.3)
PROT SERPL-MCNC: 7.8 G/DL — SIGNIFICANT CHANGE UP (ref 6–8.3)
RBC # BLD: 3.63 M/UL — LOW (ref 3.8–5.2)
RBC # FLD: 12.4 % — SIGNIFICANT CHANGE UP (ref 10.3–14.5)
SODIUM SERPL-SCNC: 138 MMOL/L — SIGNIFICANT CHANGE UP (ref 135–145)
WBC # BLD: 6.42 K/UL — SIGNIFICANT CHANGE UP (ref 3.8–10.5)
WBC # FLD AUTO: 6.42 K/UL — SIGNIFICANT CHANGE UP (ref 3.8–10.5)

## 2024-10-28 PROCEDURE — 30901 CONTROL OF NOSEBLEED: CPT | Mod: LT

## 2024-10-28 PROCEDURE — 99285 EMERGENCY DEPT VISIT HI MDM: CPT | Mod: 25

## 2024-10-28 PROCEDURE — 36415 COLL VENOUS BLD VENIPUNCTURE: CPT

## 2024-10-28 PROCEDURE — 30903 CONTROL OF NOSEBLEED: CPT | Mod: LT

## 2024-10-28 PROCEDURE — 99284 EMERGENCY DEPT VISIT MOD MDM: CPT | Mod: 25

## 2024-10-28 PROCEDURE — 93010 ELECTROCARDIOGRAM REPORT: CPT | Mod: 76

## 2024-10-28 PROCEDURE — 93005 ELECTROCARDIOGRAM TRACING: CPT

## 2024-10-28 PROCEDURE — 83735 ASSAY OF MAGNESIUM: CPT

## 2024-10-28 PROCEDURE — 85025 COMPLETE CBC W/AUTO DIFF WBC: CPT

## 2024-10-28 PROCEDURE — 30901 CONTROL OF NOSEBLEED: CPT

## 2024-10-28 PROCEDURE — 99283 EMERGENCY DEPT VISIT LOW MDM: CPT | Mod: 25

## 2024-10-28 PROCEDURE — 80053 COMPREHEN METABOLIC PANEL: CPT

## 2024-10-28 RX ORDER — SODIUM CHLORIDE 0.9 % (FLUSH) 0.9 %
1000 SYRINGE (ML) INJECTION ONCE
Refills: 0 | Status: COMPLETED | OUTPATIENT
Start: 2024-10-28 | End: 2024-10-28

## 2024-10-28 RX ADMIN — Medication 1000 MILLILITER(S): at 18:37

## 2024-10-28 NOTE — ED PROVIDER NOTE - CARE PROVIDERS DIRECT ADDRESSES
,DirectAddress_Unknown,DirectAddress_Unknown,[email or web address],DirectAddress_Unknown ,DirectAddress_[email or web address]_Unknown,[email or web address],DirectAddress_Unknown,[email or web address]

## 2024-10-28 NOTE — ED PROVIDER NOTE - CARE PLAN
Principal Discharge DX:	Epistaxis   1 Principal Discharge DX:	Epistaxis  Secondary Diagnosis:	Tachycardia

## 2024-10-28 NOTE — ED PROVIDER NOTE - PROGRESS NOTE DETAILS
Patient was waiting for EMS transportation back to her assisted living when EMS arrived vitals were retaken by RN her heart rate was greater than 130 patient was refusing any care including EKG labs or IV fluids however after extensive discussion patient finally consented to EKG labs and IV fluid EKG shared with Dr. Reynoso (attending cardiologist) he recommended metoprolol IV however prior to being ordered patient went to the bathroom and after urinating heart rate decreased to the 90s repeat EKG shows sinus rhythm 97 bpm  Reevaluated patient at bedside.  Patient feeling well, eager to be d/c back to her assisted living, refusing to stay for monitoring or cardiology consult tomorrow.  Discussed the results of all diagnostic testing in ED and copies of all reports given.   An opportunity to ask questions was given.  Discussed the importance of prompt, close medical follow-up.  Patient will return with any changes, concerns or persistent / worsening symptoms.  Understanding of all instructions verbalized.

## 2024-10-28 NOTE — ED ADULT NURSE REASSESSMENT NOTE - NS ED NURSE REASSESS COMMENT FT1
Pt states "I am nervous." Pt denies cp and sob. Pt refusing rectal temp and blood work. EKG completed and given to . Pt resting on cm, Ambulance arrived, VS taken prior to discharge, HR elevated. Pt states "I am nervous." Pt denies cp and sob. Pt refusing rectal temp and blood work. EKG completed and given to . Pt resting on cm.  Tele tech aware

## 2024-10-28 NOTE — ED PROVIDER NOTE - NPI NUMBER (FOR SYSADMIN USE ONLY) :
[[id number]],[[id number]],[[id number]] [[id number]],[[id number]],[[id number]],[[id number]]

## 2024-10-28 NOTE — ED PROVIDER NOTE - NSFOLLOWUPINSTRUCTIONS_ED_ALL_ED_FT
Nosebleed, Adult  A nosebleed is when blood comes out of the nose. Nosebleeds are common. Usually, they are not a sign of a serious condition.    Nosebleeds can happen if a blood vessel in your nose starts to bleed or if the lining of your nose (mucous membrane) cracks. They are commonly caused by:  Allergies.  Colds.  Picking your nose.  Blowing your nose too hard.  An injury from sticking an object into your nose or getting hit in the nose.  Dry or cold air.  Less common causes of nosebleeds include:  Toxic fumes.  Something abnormal in the nose or in the air-filled spaces in the bones of the face (sinuses).  Growths in the nose, such as polyps.  Blood thinners or conditions that cause blood to clot slowly.  Certain illnesses or procedures that irritate or dry out the nasal passages.  Follow these instructions at home:  When you have a nosebleed:      Sit down and tilt your head slightly forward.  Use a clean towel or tissue to pinch your nostrils under the bony part of your nose. After 5 minutes, let go of your nose and see if bleeding starts again. Do not release pressure before that time. If there is still bleeding, repeat the pinching and holding for 5 minutes or until the bleeding stops.  Do not place tissues or gauze in the nose to stop the bleeding.  Avoid lying down and avoid tilting your head backward. That may make blood collect in the throat and cause gagging or coughing.  Use a nasal spray decongestant to help with a nosebleed as told by your health care provider.  After a nosebleed:    Avoid blowing your nose or sniffing for a number of hours.  Avoid straining, lifting, or bending at the waist for several days. You may go back to other normal activities as you are able.  If you are taking aspirin or blood thinners and you have nosebleeds, talk to your health care provider. These medicines make bleeding more likely.  Ask your health care provider if you should stop taking the medicines or if you should adjust the dose.  Do not stop taking medicines that your health care provider has recommended unless he or she tells you to stop taking them.  If your nosebleed was caused by dry mucous membranes, use over-the-counter saline nasal spray or gel and a humidifier as told by your health care provider. This will keep the mucous membranes moist and allow them to heal. If you need to use one of these products:  Choose one that is water-soluble.  Use only as much as you need and use it only as often as needed.  Do not lie down right after you use it.  If you get nosebleeds often, talk with your health care provider about medical treatments. Options may include:  Nasal cautery. This treatment stops and prevents nosebleeds by using a chemical swab or electrical device to lightly burn tiny blood vessels inside the nose.  Nasal packing. A gauze or other material is placed in the nose to keep constant pressure on the bleeding area.  Contact a health care provider if you:  Have a fever.  Get nosebleeds often or more often than usual.  Bruise very easily.  Have a nosebleed from having something stuck in your nose.  Have bleeding in your mouth.  Vomit or cough up brown material.  Have a nosebleed after you start a new medicine.  Get help right away if:  You have a nosebleed after a fall or a head injury.  Your nosebleed does not go away after 20 minutes.  You feel dizzy or weak.  You have unusual bleeding from other parts of your body.  You have unusual bruising on other parts of your body.  You become sweaty.  You vomit blood.  Summary  A nosebleed is when blood comes out of the nose. Common causes include allergies, an injury to the nose, or cold or dry air.  Initial treatment includes applying pressure for 5 minutes.  Moisturizing the nose with saline nasal spray or gel after a nosebleed may help prevent future bleeding.  Get help right away if your nosebleed does not go away after 20 minutes.  This information is not intended to replace advice given to you by your health care provider. Make sure you discuss any questions you have with your health care provider. Nosebleed, Adult  A nosebleed is when blood comes out of the nose. Nosebleeds are common. Usually, they are not a sign of a serious condition.    Nosebleeds can happen if a blood vessel in your nose starts to bleed or if the lining of your nose (mucous membrane) cracks. They are commonly caused by:  Allergies.  Colds.  Picking your nose.  Blowing your nose too hard.  An injury from sticking an object into your nose or getting hit in the nose.  Dry or cold air.  Less common causes of nosebleeds include:  Toxic fumes.  Something abnormal in the nose or in the air-filled spaces in the bones of the face (sinuses).  Growths in the nose, such as polyps.  Blood thinners or conditions that cause blood to clot slowly.  Certain illnesses or procedures that irritate or dry out the nasal passages.  Follow these instructions at home:  When you have a nosebleed:      Sit down and tilt your head slightly forward.  Use a clean towel or tissue to pinch your nostrils under the bony part of your nose. After 5 minutes, let go of your nose and see if bleeding starts again. Do not release pressure before that time. If there is still bleeding, repeat the pinching and holding for 5 minutes or until the bleeding stops.  Do not place tissues or gauze in the nose to stop the bleeding.  Avoid lying down and avoid tilting your head backward. That may make blood collect in the throat and cause gagging or coughing.  Use a nasal spray decongestant to help with a nosebleed as told by your health care provider.  After a nosebleed:    Avoid blowing your nose or sniffing for a number of hours.  Avoid straining, lifting, or bending at the waist for several days. You may go back to other normal activities as you are able.  If you are taking aspirin or blood thinners and you have nosebleeds, talk to your health care provider. These medicines make bleeding more likely.  Ask your health care provider if you should stop taking the medicines or if you should adjust the dose.  Do not stop taking medicines that your health care provider has recommended unless he or she tells you to stop taking them.  If your nosebleed was caused by dry mucous membranes, use over-the-counter saline nasal spray or gel and a humidifier as told by your health care provider. This will keep the mucous membranes moist and allow them to heal. If you need to use one of these products:  Choose one that is water-soluble.  Use only as much as you need and use it only as often as needed.  Do not lie down right after you use it.  If you get nosebleeds often, talk with your health care provider about medical treatments. Options may include:  Nasal cautery. This treatment stops and prevents nosebleeds by using a chemical swab or electrical device to lightly burn tiny blood vessels inside the nose.  Nasal packing. A gauze or other material is placed in the nose to keep constant pressure on the bleeding area.  Contact a health care provider if you:  Have a fever.  Get nosebleeds often or more often than usual.  Bruise very easily.  Have a nosebleed from having something stuck in your nose.  Have bleeding in your mouth.  Vomit or cough up brown material.  Have a nosebleed after you start a new medicine.  Get help right away if:  You have a nosebleed after a fall or a head injury.  Your nosebleed does not go away after 20 minutes.  You feel dizzy or weak.  You have unusual bleeding from other parts of your body.  You have unusual bruising on other parts of your body.  You become sweaty.  You vomit blood.  Summary  A nosebleed is when blood comes out of the nose. Common causes include allergies, an injury to the nose, or cold or dry air.  Initial treatment includes applying pressure for 5 minutes.  Moisturizing the nose with saline nasal spray or gel after a nosebleed may help prevent future bleeding.  Get help right away if your nosebleed does not go away after 20 minutes.  This information is not intended to replace advice given to you by your health care provider. Make sure you discuss any questions you have with your health care provider.        TACHYCARDIA - AfterCare(R) Instructions(ER/ED)    Tachycardia    WHAT YOU NEED TO KNOW:    Tachycardia (fast heart rate) is when your heart rate is 100 beats per minute or more at rest. It is normal for the heart rate to increase with activity or exercise and then decrease when you stop. A fast heart rate at rest may be caused by strong emotions, fever, activity, some medicines, drugs, or caffeine.  Heart Chambers    DISCHARGE INSTRUCTIONS:    Call your local emergency number (911 in the [place]) or have someone call if:    You have any of the following signs of a heart attack:  Squeezing, pressure, or pain in your chest    You may also have any of the following:  Discomfort or pain in your back, neck, jaw, stomach, or arm    Shortness of breath    Nausea or vomiting    Lightheadedness or a sudden cold sweat    You cannot be woken.  Call your doctor or cardiologist if:    Your pulse is faster than you were told it should be.    You have a fast heart rate often.    You feel weak or dizzy.    You have questions or concerns about your condition or care.  Medicines:    Medicines can help control your heart rate or rhythm. You may need more than one medicine to treat your symptoms.    Take your medicine as directed. Contact your healthcare provider if you think your medicine is not helping or if you have side effects. Tell your provider if you are allergic to any medicine. Keep a list of the medicines, vitamins, and herbs you take. Include the amounts, and when and why you take them. Bring the list or the pill bottles to follow-up visits. Carry your medicine list with you in case of an emergency.  Check your heart rate (pulse) as directed: Your healthcare provider will show you how to check your pulse, and how often to check it. Write down how fast your pulse is and if it feels regular or like it is skipping beats. Also write down the activity you were doing if your heart rate is above 100. Bring the information with you to your follow-up appointment.  How to Take a Pulse    Help prevent a fast heart rate:    Have less caffeine. Caffeine can increase your heart rate.    Limit or do not drink alcohol. Alcohol can increase your heart rate. Ask your healthcare provider if it is okay for you to drink any alcohol. He or she can help you set limits for the number of drinks you have in 24 hours and in a week. A drink of alcohol is 12 ounces of beer, 5 ounces of wine, or 1½ ounces of liquor.    Do not smoke. Nicotine and other chemicals in cigarettes can cause damage to your heart. Ask your healthcare provider for information if you currently smoke and need help to quit. E-cigarettes or smokeless tobacco still contain nicotine. Talk to your healthcare provider before you use these products.    Do not use illegal drugs. Drugs such as meth and cocaine can increase your heart rate. Talk to your healthcare provider if you use illegal drugs and want to quit.    Get more rest. Fatigue can cause your heart rate to increase. Ask your healthcare provider about the best exercise plan for you.    Eat heart-healthy foods. These include fruits, vegetables, whole-grain breads, low-fat dairy products, beans, lean meats, and fish. Replace butter and margarine with heart-healthy oils such as olive oil and canola oil.        Exercise regularly and maintain a healthy weight. Ask about the best exercise plan for you. Ask your healthcare provider what a healthy weight is for you. Ask him or her to help you create a safe weight loss plan if you are overweight.   FAMILY WALKING FOR EXERCISE      Learn ways to cope with stress. Stress, fear, and anxiety can cause a fast heart rate. Your healthcare provider may recommend relaxation techniques and deep breathing exercises. Your healthcare provider may recommend you talk to someone about your stress or anxiety, such as a counselor or a trusted friend.    Talk to your healthcare provider about all your current medicines. He or she may change a medicine if it is causing your fast heart rate. Do not stop taking any medicine unless directed by your provider.  Follow up with your doctor or cardiologist as directed: You may need more tests. Write down your questions so you remember to ask them at your visit.

## 2024-10-28 NOTE — ED PROVIDER NOTE - PROVIDER TOKENS
PROVIDER:[TOKEN:[[id number]],FOLLOWUP:[1-3 Days]],PROVIDER:[TOKEN:[[id number]],FOLLOWUP:[1-3 Days]],PROVIDER:[TOKEN:[[id number]],FOLLOWUP:[1-3 Days]],PROVIDER:[TOKEN:[[id number]],FOLLOWUP:[1-3 Days]],PROVIDER:[TOKEN:[[id number]],FOLLOWUP:[1-3 Days]] PROVIDER:[TOKEN:[[id number]],FOLLOWUP:[1-3 Days]],PROVIDER:[TOKEN:[[id number]],FOLLOWUP:[1-3 Days]],PROVIDER:[TOKEN:[[id number]],FOLLOWUP:[1-3 Days]],PROVIDER:[TOKEN:[[id number]],FOLLOWUP:[1-3 Days]],PROVIDER:[TOKEN:[[id number]],FOLLOWUP:[1-3 Days]],PROVIDER:[TOKEN:[[id number]],FOLLOWUP:[1-3 Days]]

## 2024-10-28 NOTE — ED CLERICAL - CLERICAL COMMENTS
called [facility] at 1413 for transport back to [place].  [facility] will  with next available. called [facility] at 1413 for transport back to [place].  [facility] will  with next available. called [facility] at 2030 for transport back to [place].  [facility] will  with next available.

## 2024-10-28 NOTE — ED ADULT TRIAGE NOTE - NS ED NURSE DIRECT TO ROOM YN
[FreeTextEntry1] : 44 yo F presents with right scalp mass\par present for 2 years\par increasing in size\par intermittent drainage\par no interventions\par no hx of similar lesions elsewhere\par no imaging\par patient states that increase in size causes discomfort Yes

## 2024-10-28 NOTE — ED ADULT NURSE NOTE - OBJECTIVE STATEMENT
Pt is alert and oriented. Pt states that she developed a nose bleed in the left nostril after sneezing this morning. Pt states that the bleeding hasn't stopped. Pt denies blood thinners. Pt has packing in currently, pt bleeding around the packing. Pt denies sob, chest pain, nausea, vomiting, and dizziness. Pt resp are even and unlabored, skin color marylu for race. Pt updated on plan of care.

## 2024-10-28 NOTE — ED ADULT NURSE NOTE - NSFALLUNIVINTERV_ED_ALL_ED
Bed/Stretcher in lowest position, wheels locked, appropriate side rails in place/Call bell, personal items and telephone in reach/Instruct patient to call for assistance before getting out of bed/chair/stretcher/Non-slip footwear applied when patient is off stretcher/[place] to call system/Physically safe environment - no spills, clutter or unnecessary equipment/Purposeful proactive rounding/Room/bathroom lighting operational, light cord in reach

## 2024-10-28 NOTE — ED PROVIDER NOTE - OBJECTIVE STATEMENT
Patient brought in by EMS for epistaxis from left nostril today.  Patient denies trauma pain any other complaints.  PMD Noya

## 2024-10-28 NOTE — ED PROVIDER NOTE - CLINICAL SUMMARY MEDICAL DECISION MAKING FREE TEXT BOX
Patient brought in by EMS for epistaxis from left nostril today.  Patient denies trauma pain any other complaints.  PMD Noya    Plan nasal packing

## 2024-10-28 NOTE — ED PROVIDER NOTE - PATIENT PORTAL LINK FT
You can access the FollowMyHealth Patient Portal offered by [facility] by registering at the following website: [email or web address]. By joining [employer]’s FollowMyHealth portal, you will also be able to view your health information using other applications (apps) compatible with our system. You can access the FollowMyHealth Patient Portal offered by [facility] by registering at the following website: [email or web address]. By joining [employer]’s FollowMyHealth portal, you will also be able to view your health information using other applications (apps) compatible with our system.

## 2024-10-28 NOTE — ED PROVIDER NOTE - CARE PROVIDER_API CALL
Jana Ewing  Nephrology  [address] [place] [address]  Phone: [phone]  Fax: [phone]  Follow Up Time: 1-3 Days    Francisco Bedoya  Otolaryngology  [address] [place] [address], [address]  [place] [address]  Phone: [phone]  Fax: [phone]  Follow Up Time: 1-3 Days    Ken Hoffmann  Otolaryngology  [address] [facility], [address] 2  [place] [address]  Phone: [phone]  Fax: [phone]  Follow Up Time: 1-3 Days    Almas Rae  Otolaryngology  [address] [place] [address], [address]  [place] [address]  Phone: [phone]  Fax: [phone]  Follow Up Time: 1-3 Days    Jared Goss  Otolaryngology  [address] [place] [address]  Phone: [phone]  Fax: [phone]  Follow Up Time: 1-3 Days   Jana Ewing  Nephrology  [address] [place] [address]  Phone: [phone]  Fax: [phone]  Follow Up Time: 1-3 Days    Francisco Bedoya  Otolaryngology  [address] [place] [address], [address]  [place] [address]  Phone: [phone]  Fax: [phone]  Follow Up Time: 1-3 Days    Ken Hoffmann  Otolaryngology  [address] [facility], [address]  [place] [address]  Phone: [phone]  Fax: [phone]  Follow Up Time: 1-3 Days    Almas Rae  Otolaryngology  [address] [facility], [address]  [place] [address]  Phone: [phone]  Fax: [phone]  Follow Up Time: 1-3 Days    Jared Goss  Otolaryngology  [address] [place] [address]  Phone: [phone]  Fax: [phone]  Follow Up Time: 1-3 Days    Palla, Venugopal Reddy  Cardiovascular Disease  [address] [facility], [address]  [place] [address]  Phone: [phone]  Fax: [phone]  Follow Up Time: 1-3 Days

## 2025-06-10 ENCOUNTER — INPATIENT (INPATIENT)
Facility: HOSPITAL | Age: 85
LOS: 5 days | Discharge: NURSING, REHAB OR LONG-TERM CARE | DRG: 641 | End: 2025-06-16
Attending: INTERNAL MEDICINE | Admitting: INTERNAL MEDICINE
Payer: COMMERCIAL

## 2025-06-10 VITALS
TEMPERATURE: 98 F | OXYGEN SATURATION: 96 % | SYSTOLIC BLOOD PRESSURE: 133 MMHG | WEIGHT: 130.07 LBS | HEIGHT: 64 IN | RESPIRATION RATE: 19 BRPM | DIASTOLIC BLOOD PRESSURE: 79 MMHG | HEART RATE: 73 BPM

## 2025-06-10 DIAGNOSIS — E87.5 HYPERKALEMIA: ICD-10-CM

## 2025-06-10 LAB
ALBUMIN SERPL ELPH-MCNC: 3.7 G/DL — SIGNIFICANT CHANGE UP (ref 3.3–5)
ALP SERPL-CCNC: 127 U/L — HIGH (ref 30–120)
ALT FLD-CCNC: 37 U/L — SIGNIFICANT CHANGE UP (ref 10–60)
ANION GAP SERPL CALC-SCNC: 8 MMOL/L — SIGNIFICANT CHANGE UP (ref 5–17)
APTT BLD: 42.6 SEC — HIGH (ref 26.1–36.8)
AST SERPL-CCNC: 30 U/L — SIGNIFICANT CHANGE UP (ref 10–40)
BASOPHILS # BLD AUTO: 0.01 K/UL — SIGNIFICANT CHANGE UP (ref 0–0.2)
BASOPHILS NFR BLD AUTO: 0.2 % — SIGNIFICANT CHANGE UP (ref 0–2)
BILIRUB SERPL-MCNC: 0.3 MG/DL — SIGNIFICANT CHANGE UP (ref 0.2–1.2)
BUN SERPL-MCNC: 61 MG/DL — HIGH (ref 7–23)
CALCIUM SERPL-MCNC: 10 MG/DL — SIGNIFICANT CHANGE UP (ref 8.4–10.5)
CHLORIDE SERPL-SCNC: 105 MMOL/L — SIGNIFICANT CHANGE UP (ref 96–108)
CO2 SERPL-SCNC: 23 MMOL/L — SIGNIFICANT CHANGE UP (ref 22–31)
CREAT SERPL-MCNC: 2.23 MG/DL — HIGH (ref 0.5–1.3)
EGFR: 21 ML/MIN/1.73M2 — LOW
EGFR: 21 ML/MIN/1.73M2 — LOW
EOSINOPHIL # BLD AUTO: 0.03 K/UL — SIGNIFICANT CHANGE UP (ref 0–0.5)
EOSINOPHIL NFR BLD AUTO: 0.6 % — SIGNIFICANT CHANGE UP (ref 0–6)
GLUCOSE SERPL-MCNC: 105 MG/DL — HIGH (ref 70–99)
HCT VFR BLD CALC: 32.2 % — LOW (ref 34.5–45)
HGB BLD-MCNC: 10.2 G/DL — LOW (ref 11.5–15.5)
IMM GRANULOCYTES NFR BLD AUTO: 0.2 % — SIGNIFICANT CHANGE UP (ref 0–0.9)
INR BLD: 0.94 RATIO — SIGNIFICANT CHANGE UP (ref 0.85–1.16)
LYMPHOCYTES # BLD AUTO: 0.56 K/UL — LOW (ref 1–3.3)
LYMPHOCYTES # BLD AUTO: 11.5 % — LOW (ref 13–44)
MCHC RBC-ENTMCNC: 30.9 PG — SIGNIFICANT CHANGE UP (ref 27–34)
MCHC RBC-ENTMCNC: 31.7 G/DL — LOW (ref 32–36)
MCV RBC AUTO: 97.6 FL — SIGNIFICANT CHANGE UP (ref 80–100)
MONOCYTES # BLD AUTO: 0.57 K/UL — SIGNIFICANT CHANGE UP (ref 0–0.9)
MONOCYTES NFR BLD AUTO: 11.7 % — SIGNIFICANT CHANGE UP (ref 2–14)
NEUTROPHILS # BLD AUTO: 3.69 K/UL — SIGNIFICANT CHANGE UP (ref 1.8–7.4)
NEUTROPHILS NFR BLD AUTO: 75.8 % — SIGNIFICANT CHANGE UP (ref 43–77)
NRBC BLD AUTO-RTO: 0 /100 WBCS — SIGNIFICANT CHANGE UP (ref 0–0)
PLATELET # BLD AUTO: 131 K/UL — LOW (ref 150–400)
POTASSIUM SERPL-MCNC: 6.5 MMOL/L — CRITICAL HIGH (ref 3.5–5.3)
POTASSIUM SERPL-SCNC: 6.5 MMOL/L — CRITICAL HIGH (ref 3.5–5.3)
PROT SERPL-MCNC: 7.7 G/DL — SIGNIFICANT CHANGE UP (ref 6–8.3)
PROTHROM AB SERPL-ACNC: 10.9 SEC — SIGNIFICANT CHANGE UP (ref 9.9–13.4)
RBC # BLD: 3.3 M/UL — LOW (ref 3.8–5.2)
RBC # FLD: 13 % — SIGNIFICANT CHANGE UP (ref 10.3–14.5)
SODIUM SERPL-SCNC: 136 MMOL/L — SIGNIFICANT CHANGE UP (ref 135–145)
WBC # BLD: 4.87 K/UL — SIGNIFICANT CHANGE UP (ref 3.8–10.5)
WBC # FLD AUTO: 4.87 K/UL — SIGNIFICANT CHANGE UP (ref 3.8–10.5)

## 2025-06-10 PROCEDURE — 93010 ELECTROCARDIOGRAM REPORT: CPT

## 2025-06-10 PROCEDURE — 99285 EMERGENCY DEPT VISIT HI MDM: CPT

## 2025-06-10 PROCEDURE — 71045 X-RAY EXAM CHEST 1 VIEW: CPT | Mod: 26

## 2025-06-10 RX ORDER — SODIUM CHLORIDE 9 G/1000ML
1000 INJECTION, SOLUTION INTRAVENOUS
Refills: 0 | Status: DISCONTINUED | OUTPATIENT
Start: 2025-06-10 | End: 2025-06-11

## 2025-06-10 RX ORDER — ACETAMINOPHEN 500 MG/5ML
1 LIQUID (ML) ORAL
Refills: 0 | DISCHARGE

## 2025-06-10 RX ORDER — DEXTROSE 50 % IN WATER 50 %
50 SYRINGE (ML) INTRAVENOUS ONCE
Refills: 0 | Status: COMPLETED | OUTPATIENT
Start: 2025-06-10 | End: 2025-06-10

## 2025-06-10 RX ORDER — OMEPRAZOLE 20 MG/1
1 CAPSULE, DELAYED RELEASE ORAL
Refills: 0 | DISCHARGE

## 2025-06-10 RX ORDER — SODIUM CHLORIDE 0.65 %
2 AEROSOL, SPRAY (ML) NASAL
Refills: 0 | DISCHARGE

## 2025-06-10 RX ORDER — HEPARIN SODIUM 1000 [USP'U]/ML
5000 INJECTION INTRAVENOUS; SUBCUTANEOUS EVERY 12 HOURS
Refills: 0 | Status: DISCONTINUED | OUTPATIENT
Start: 2025-06-10 | End: 2025-06-16

## 2025-06-10 RX ORDER — DICLOFENAC SODIUM 10 MG/G
2.25 GEL TOPICAL
Refills: 0 | DISCHARGE

## 2025-06-10 RX ORDER — RISPERIDONE 4 MG
1 TABLET ORAL
Refills: 0 | DISCHARGE

## 2025-06-10 RX ORDER — MIRTAZAPINE 30 MG/1
1 TABLET, FILM COATED ORAL
Refills: 0 | DISCHARGE

## 2025-06-10 RX ORDER — CALCIUM GLUCONATE 20 MG/ML
1 INJECTION, SOLUTION INTRAVENOUS ONCE
Refills: 0 | Status: COMPLETED | OUTPATIENT
Start: 2025-06-10 | End: 2025-06-10

## 2025-06-10 RX ORDER — FERROUS SULFATE 137(45) MG
325 TABLET, EXTENDED RELEASE ORAL
Refills: 0 | DISCHARGE

## 2025-06-10 RX ORDER — LEVOTHYROXINE SODIUM 300 MCG
75 TABLET ORAL DAILY
Refills: 0 | Status: DISCONTINUED | OUTPATIENT
Start: 2025-06-10 | End: 2025-06-16

## 2025-06-10 RX ORDER — LEVOTHYROXINE SODIUM 300 MCG
1 TABLET ORAL
Refills: 0 | DISCHARGE

## 2025-06-10 RX ADMIN — Medication 10 UNIT(S): at 20:38

## 2025-06-10 RX ADMIN — Medication 1000 MILLILITER(S): at 21:00

## 2025-06-10 RX ADMIN — SODIUM CHLORIDE 75 MILLILITER(S): 9 INJECTION, SOLUTION INTRAVENOUS at 23:05

## 2025-06-10 RX ADMIN — Medication 50 MILLILITER(S): at 20:37

## 2025-06-10 RX ADMIN — CALCIUM GLUCONATE 100 GRAM(S): 20 INJECTION, SOLUTION INTRAVENOUS at 20:42

## 2025-06-10 NOTE — H&P ADULT - ASSESSMENT
84-year-old female history of high blood pressure hypothyroid sent in from [facility] for elevated potassium level.  Had lab work done yesterday that showed a creatinine of 1.9 and a potassium of 6.4.  Patient currently asymptomatic has no complaints.  Was given Lokelma today as per Dr. Ewing's prescription.   In ED bp133/79,HR 73, afbrile, RR 19, K 6.5, bun creat higher than base line, pt started on dextrose Insulin and is being admitted for further management  # Abnormal Labs- EVERETTE with CKD3, base line cr 1.8  hydration   nephro consult   dextrose with insulin received in ED  #hyperkalemia  dextrose with insulin will monitor  #Dementia  supportive car  #Anemia  ch with ckd  #MDD  cont home meds  RA   pain meds as needed  #dvt pro   goals of care to be discussed

## 2025-06-10 NOTE — ED ADULT NURSE NOTE - NSICDXPASTMEDICALHX_GEN_ALL_CORE_FT
PAST MEDICAL HISTORY:  Anemia     Arthritis     Dementia     HTN (hypertension)     Major depressive disorder     RA (rheumatoid arthritis)

## 2025-06-10 NOTE — ED ADULT TRIAGE NOTE - CHIEF COMPLAINT QUOTE
patient BIBA from [facility] for elevated K+ 6.3 , no chest pain no difficulty breathing at this time

## 2025-06-10 NOTE — ED ADULT NURSE REASSESSMENT NOTE - COMFORT CARE
pt placed in hospital bed for further comfort./repositioned pt placed in hospital bed for further comfort./meal provided/po fluids offered/repositioned

## 2025-06-10 NOTE — ED ADULT NURSE NOTE - CHIEF COMPLAINT QUOTE
patient BIBA from [facility] for elevated K+ 6.3 , no chest pain no difficulty breathing at this time

## 2025-06-10 NOTE — ED PROVIDER NOTE - CLINICAL SUMMARY MEDICAL DECISION MAKING FREE TEXT BOX
84-year-old female history of high blood pressure hypothyroid sent in from [facility] for elevated potassium level.  Had lab work done yesterday that showed a creatinine of 1.9 and a potassium of 6.4.  Patient currently asymptomatic has no complaints.  Was given Lokelma today as per Dr. Ewing's prescription.    sent in for hyperkalemia, will recheck labs, ekg

## 2025-06-10 NOTE — ED ADULT TRIAGE NOTE - GLASGOW COMA SCALE: EYE OPENING, MLM
Refill Decision Note   Gopi Bradshawilly  is requesting a refill authorization.  Brief Assessment and Rationale for Refill:  Approve     Medication Therapy Plan:  FLOS 6/9/2023    Medication Reconciliation Completed: No    Comments:     No Care Gaps recommended.     Note composed:1:21 PM 04/17/2023             (E4) spontaneous

## 2025-06-10 NOTE — H&P ADULT - HISTORY OF PRESENT ILLNESS
84-year-old female history of high blood pressure hypothyroid sent in from [facility] for elevated potassium level.  Had lab work done yesterday that showed a creatinine of 1.9 and a potassium of 6.4.  Patient currently asymptomatic has no complaints.  Was given Lokelma today as per Dr. Ewing's prescription.   In ED bp133/79,HR 73, afbrile, RR 19, K 6.5, bun creat higher than base line, pt started on dextrose Insulin and is being admitted for further management

## 2025-06-10 NOTE — ED ADULT NURSE NOTE - OBJECTIVE STATEMENT
pt arrives to ED from [facility] with reports of K 6.3. pt pleasantly confused, baseline dementia. denies any complaints.

## 2025-06-10 NOTE — ED ADULT NURSE NOTE - CHPI ED NUR DURATION
[employer].              today

## 2025-06-10 NOTE — H&P ADULT - TIME BILLING
symmetrical I have discussed care plan with patient and HCP,expressed understanding of problems treatment and their effect and side effects, alternatives in detail,I have asked if they have any questions and concerns and appropriately addressed them to best of my ability  Reviewed all diagonostic tests, lab results and drug drug interactions, and medications

## 2025-06-10 NOTE — ED ADULT NURSE NOTE - NSFALLHARMRISKINTERV_ED_ALL_ED
Assistance OOB with selected safe patient handling equipment if applicable/Assistance with ambulation/Communicate risk of Fall with Harm to all staff, patient, and family/Monitor gait and stability/Monitor for mental status changes and reorient to person, place, and time, as needed/Move patient closer to nursing station/within visual sight of ED staff/Provide patient with walking aids/Provide visual cue: red socks, yellow wristband, yellow gown, etc/Reinforce activity limits and safety measures with patient and family/Toileting schedule using arm’s reach rule for commode and bathroom/Use of alarms - bed, stretcher, chair and/or video monitoring/Bed in lowest position, wheels locked, appropriate side rails in place/Call bell, personal items and telephone in reach/Instruct patient to call for assistance before getting out of bed/chair/stretcher/Non-slip footwear applied when patient is off stretcher/[place] to call system/Physically safe environment - no spills, clutter or unnecessary equipment/Purposeful Proactive Rounding/Room/bathroom lighting operational, light cord in reach

## 2025-06-10 NOTE — ED PROVIDER NOTE - OBJECTIVE STATEMENT
84-year-old female history of high blood pressure hypothyroid sent in from [facility] for elevated potassium level.  Had lab work done yesterday that showed a creatinine of 1.9 and a potassium of 6.4.  Patient currently asymptomatic has no complaints.  Was given Lokelma today as per Dr. Ewing's prescription.

## 2025-06-11 LAB
ALBUMIN SERPL ELPH-MCNC: 3.2 G/DL — LOW (ref 3.3–5)
ALP SERPL-CCNC: 111 U/L — SIGNIFICANT CHANGE UP (ref 30–120)
ALT FLD-CCNC: 28 U/L — SIGNIFICANT CHANGE UP (ref 10–60)
ANION GAP SERPL CALC-SCNC: 10 MMOL/L — SIGNIFICANT CHANGE UP (ref 5–17)
ANION GAP SERPL CALC-SCNC: 11 MMOL/L — SIGNIFICANT CHANGE UP (ref 5–17)
ANION GAP SERPL CALC-SCNC: 8 MMOL/L — SIGNIFICANT CHANGE UP (ref 5–17)
APPEARANCE UR: CLEAR — SIGNIFICANT CHANGE UP
AST SERPL-CCNC: 25 U/L — SIGNIFICANT CHANGE UP (ref 10–40)
BACTERIA # UR AUTO: NEGATIVE /HPF — SIGNIFICANT CHANGE UP
BASOPHILS # BLD AUTO: 0.02 K/UL — SIGNIFICANT CHANGE UP (ref 0–0.2)
BASOPHILS NFR BLD AUTO: 0.5 % — SIGNIFICANT CHANGE UP (ref 0–2)
BILIRUB SERPL-MCNC: 0.2 MG/DL — SIGNIFICANT CHANGE UP (ref 0.2–1.2)
BILIRUB UR-MCNC: NEGATIVE — SIGNIFICANT CHANGE UP
BUN SERPL-MCNC: 46 MG/DL — HIGH (ref 7–23)
BUN SERPL-MCNC: 49 MG/DL — HIGH (ref 7–23)
BUN SERPL-MCNC: 53 MG/DL — HIGH (ref 7–23)
CALCIUM SERPL-MCNC: 9.4 MG/DL — SIGNIFICANT CHANGE UP (ref 8.4–10.5)
CALCIUM SERPL-MCNC: 9.5 MG/DL — SIGNIFICANT CHANGE UP (ref 8.4–10.5)
CALCIUM SERPL-MCNC: 9.6 MG/DL — SIGNIFICANT CHANGE UP (ref 8.4–10.5)
CHLORIDE SERPL-SCNC: 105 MMOL/L — SIGNIFICANT CHANGE UP (ref 96–108)
CHLORIDE SERPL-SCNC: 106 MMOL/L — SIGNIFICANT CHANGE UP (ref 96–108)
CHLORIDE SERPL-SCNC: 107 MMOL/L — SIGNIFICANT CHANGE UP (ref 96–108)
CO2 SERPL-SCNC: 20 MMOL/L — LOW (ref 22–31)
CO2 SERPL-SCNC: 22 MMOL/L — SIGNIFICANT CHANGE UP (ref 22–31)
CO2 SERPL-SCNC: 25 MMOL/L — SIGNIFICANT CHANGE UP (ref 22–31)
COLOR SPEC: YELLOW — SIGNIFICANT CHANGE UP
CREAT SERPL-MCNC: 1.9 MG/DL — HIGH (ref 0.5–1.3)
CREAT SERPL-MCNC: 1.91 MG/DL — HIGH (ref 0.5–1.3)
CREAT SERPL-MCNC: 2 MG/DL — HIGH (ref 0.5–1.3)
DIFF PNL FLD: ABNORMAL
EGFR: 24 ML/MIN/1.73M2 — LOW
EGFR: 24 ML/MIN/1.73M2 — LOW
EGFR: 25 ML/MIN/1.73M2 — LOW
EGFR: 25 ML/MIN/1.73M2 — LOW
EGFR: 26 ML/MIN/1.73M2 — LOW
EGFR: 26 ML/MIN/1.73M2 — LOW
EOSINOPHIL # BLD AUTO: 0.04 K/UL — SIGNIFICANT CHANGE UP (ref 0–0.5)
EOSINOPHIL NFR BLD AUTO: 1 % — SIGNIFICANT CHANGE UP (ref 0–6)
EPI CELLS # UR: PRESENT
GLUCOSE SERPL-MCNC: 82 MG/DL — SIGNIFICANT CHANGE UP (ref 70–99)
GLUCOSE SERPL-MCNC: 91 MG/DL — SIGNIFICANT CHANGE UP (ref 70–99)
GLUCOSE SERPL-MCNC: 97 MG/DL — SIGNIFICANT CHANGE UP (ref 70–99)
GLUCOSE UR QL: NEGATIVE MG/DL — SIGNIFICANT CHANGE UP
HCT VFR BLD CALC: 29.5 % — LOW (ref 34.5–45)
HGB BLD-MCNC: 9.4 G/DL — LOW (ref 11.5–15.5)
HYALINE CASTS # UR AUTO: SIGNIFICANT CHANGE UP
IMM GRANULOCYTES NFR BLD AUTO: 0.3 % — SIGNIFICANT CHANGE UP (ref 0–0.9)
KETONES UR QL: NEGATIVE MG/DL — SIGNIFICANT CHANGE UP
LEUKOCYTE ESTERASE UR-ACNC: ABNORMAL
LYMPHOCYTES # BLD AUTO: 0.47 K/UL — LOW (ref 1–3.3)
LYMPHOCYTES # BLD AUTO: 12.1 % — LOW (ref 13–44)
MCHC RBC-ENTMCNC: 31.4 PG — SIGNIFICANT CHANGE UP (ref 27–34)
MCHC RBC-ENTMCNC: 31.9 G/DL — LOW (ref 32–36)
MCV RBC AUTO: 98.7 FL — SIGNIFICANT CHANGE UP (ref 80–100)
MONOCYTES # BLD AUTO: 0.42 K/UL — SIGNIFICANT CHANGE UP (ref 0–0.9)
MONOCYTES NFR BLD AUTO: 10.9 % — SIGNIFICANT CHANGE UP (ref 2–14)
MRSA PCR RESULT.: SIGNIFICANT CHANGE UP
NEUTROPHILS # BLD AUTO: 2.91 K/UL — SIGNIFICANT CHANGE UP (ref 1.8–7.4)
NEUTROPHILS NFR BLD AUTO: 75.2 % — SIGNIFICANT CHANGE UP (ref 43–77)
NITRITE UR-MCNC: NEGATIVE — SIGNIFICANT CHANGE UP
NRBC BLD AUTO-RTO: 0 /100 WBCS — SIGNIFICANT CHANGE UP (ref 0–0)
PH UR: 7.5 — SIGNIFICANT CHANGE UP (ref 5–8)
PLATELET # BLD AUTO: 118 K/UL — LOW (ref 150–400)
POTASSIUM SERPL-MCNC: 5.5 MMOL/L — HIGH (ref 3.5–5.3)
POTASSIUM SERPL-MCNC: 5.7 MMOL/L — HIGH (ref 3.5–5.3)
POTASSIUM SERPL-MCNC: 6 MMOL/L — HIGH (ref 3.5–5.3)
POTASSIUM SERPL-SCNC: 5.5 MMOL/L — HIGH (ref 3.5–5.3)
POTASSIUM SERPL-SCNC: 5.7 MMOL/L — HIGH (ref 3.5–5.3)
POTASSIUM SERPL-SCNC: 6 MMOL/L — HIGH (ref 3.5–5.3)
PROT SERPL-MCNC: 6.6 G/DL — SIGNIFICANT CHANGE UP (ref 6–8.3)
PROT SERPL-MCNC: 6.7 G/DL — SIGNIFICANT CHANGE UP (ref 6–8.3)
PROT UR-MCNC: NEGATIVE MG/DL — SIGNIFICANT CHANGE UP
RBC # BLD: 2.99 M/UL — LOW (ref 3.8–5.2)
RBC # FLD: 12.9 % — SIGNIFICANT CHANGE UP (ref 10.3–14.5)
RBC CASTS # UR COMP ASSIST: 0 /HPF — SIGNIFICANT CHANGE UP (ref 0–4)
S AUREUS DNA NOSE QL NAA+PROBE: SIGNIFICANT CHANGE UP
SODIUM SERPL-SCNC: 138 MMOL/L — SIGNIFICANT CHANGE UP (ref 135–145)
SP GR SPEC: 1.01 — SIGNIFICANT CHANGE UP (ref 1–1.03)
T3 SERPL-MCNC: 108 NG/DL — SIGNIFICANT CHANGE UP (ref 80–200)
T4 AB SER-ACNC: 7.8 UG/DL — SIGNIFICANT CHANGE UP (ref 4.6–12)
TSH SERPL-MCNC: 3.39 UIU/ML — SIGNIFICANT CHANGE UP (ref 0.27–4.2)
UROBILINOGEN FLD QL: 0.2 MG/DL — SIGNIFICANT CHANGE UP (ref 0.2–1)
WBC # BLD: 3.87 K/UL — SIGNIFICANT CHANGE UP (ref 3.8–10.5)
WBC # FLD AUTO: 3.87 K/UL — SIGNIFICANT CHANGE UP (ref 3.8–10.5)
WBC UR QL: 2 /HPF — SIGNIFICANT CHANGE UP (ref 0–5)

## 2025-06-11 PROCEDURE — 76775 US EXAM ABDO BACK WALL LIM: CPT | Mod: 26

## 2025-06-11 RX ORDER — SODIUM CHLORIDE 9 G/1000ML
1000 INJECTION, SOLUTION INTRAVENOUS
Refills: 0 | Status: DISCONTINUED | OUTPATIENT
Start: 2025-06-11 | End: 2025-06-13

## 2025-06-11 RX ORDER — FUROSEMIDE 10 MG/ML
20 INJECTION INTRAMUSCULAR; INTRAVENOUS ONCE
Refills: 0 | Status: COMPLETED | OUTPATIENT
Start: 2025-06-11 | End: 2025-06-11

## 2025-06-11 RX ADMIN — Medication 25 MILLIGRAM(S): at 13:38

## 2025-06-11 RX ADMIN — Medication 25 MILLIGRAM(S): at 05:27

## 2025-06-11 RX ADMIN — SODIUM CHLORIDE 75 MILLILITER(S): 9 INJECTION, SOLUTION INTRAVENOUS at 08:37

## 2025-06-11 RX ADMIN — SODIUM CHLORIDE 75 MILLILITER(S): 9 INJECTION, SOLUTION INTRAVENOUS at 23:19

## 2025-06-11 RX ADMIN — Medication 25 MILLIGRAM(S): at 21:53

## 2025-06-11 RX ADMIN — HEPARIN SODIUM 5000 UNIT(S): 1000 INJECTION INTRAVENOUS; SUBCUTANEOUS at 05:26

## 2025-06-11 RX ADMIN — Medication 75 MICROGRAM(S): at 05:26

## 2025-06-11 RX ADMIN — HEPARIN SODIUM 5000 UNIT(S): 1000 INJECTION INTRAVENOUS; SUBCUTANEOUS at 17:17

## 2025-06-11 RX ADMIN — FUROSEMIDE 20 MILLIGRAM(S): 10 INJECTION INTRAMUSCULAR; INTRAVENOUS at 08:37

## 2025-06-11 NOTE — PROVIDER CONTACT NOTE (OTHER) - ASSESSMENT
The patient voided via primafit prior to exam. Pt states she does not need to urinate any further and admits to holding her urine overnight. The bladder scan resulted 527mL

## 2025-06-11 NOTE — PATIENT PROFILE ADULT - FUNCTIONAL ASSESSMENT - BASIC MOBILITY SCORE.
12

## 2025-06-11 NOTE — CONSULT NOTE ADULT - SUBJECTIVE AND OBJECTIVE BOX
HPI:    Patient is a 84y old  Female who was referred from a SNF for evaluation of " abnormal labs". No other complaints offered. Pt is confused and unaware as to why she is here. Cr 2.25, K 6.5 on STAT ED BW.   Received hyperK protocol, bolus of saline then hypotonic IVFs at 75cc/h. C/o urinary urgency.   Repeat K 6.0, Cr 2 this morning.   Of interest, Cr was NL in July of 2023 and 1.8 in Oct of 2024.           PAST MEDICAL & SURGICAL HISTORY:  Arthritis  Dementia  Anemia  HTN (hypertension)  Major depressive disorder  RA (rheumatoid arthritis)  No significant past surgical history        Allergy Status Unknown  No Known Allergies          MEDICATIONS  (STANDING):  furosemide   Injectable 20 milliGRAM(s) IV Push once  heparin   Injectable 5000 Unit(s) SubCutaneous every 12 hours  hydrALAZINE 25 milliGRAM(s) Oral three times a day  levothyroxine 75 MICROGram(s) Oral daily  sodium chloride 0.45% 1000 milliLiter(s) (75 mL/Hr) IV Continuous <Continuous>    MEDICATIONS  (PRN):      Daily Height in cm: 162.56 (10 Mustapha 2025 18:54)    Daily     Drug Dosing Weight  Height (cm): 162.6 (10 Mustapha 2025 18:54)  Weight (kg): 62.3 (11 Jun 2025 05:23)  BMI (kg/m2): 23.6 (11 Jun 2025 05:23)  BSA (m2): 1.67 (11 Jun 2025 05:23)      REVIEW OF SYSTEMS:    Per HPI            I&O's Detail        PHYSICAL EXAM:    GENERAL: NAD  HEAD:  Atraumatic, normocephalic  CHEST/LUNG: Clear to auscultation bilaterally  HEART: Regular rate and rhythm. No murmurs, rubs, or gallops  ABDOMEN: Soft, Nontender, Nondistended. POS BS  EXTREMITIES:  pos edema.         LABS:  CBC Full  -  ( 11 Jun 2025 05:45 )  WBC Count : 3.87 K/uL  RBC Count : 2.99 M/uL  Hemoglobin : 9.4 g/dL  Hematocrit : 29.5 %  Platelet Count - Automated : 118 K/uL  Mean Cell Volume : 98.7 fL  Mean Cell Hemoglobin : 31.4 pg  Mean Cell Hemoglobin Concentration : 31.9 g/dL  Auto Neutrophil # : x  Auto Lymphocyte # : x  Auto Monocyte # : x  Auto Eosinophil # : x  Auto Basophil # : x  Auto Neutrophil % : x  Auto Lymphocyte % : x  Auto Monocyte % : x  Auto Eosinophil % : x  Auto Basophil % : x    06-11    138  |  107  |  53[H]  ----------------------------<  91  6.0[H]   |  20[L]  |  2.00[H]    Ca    9.6      11 Jun 2025 05:45    TPro  6.7  /  Alb  3.2[L]  /  TBili  0.2  /  DBili  x   /  AST  25  /  ALT  28  /  AlkPhos  111  06-11        Impression:  * Subacute RF, NL Cr in July of 2023, Cr was 1.8 in Oct of 2024. Broad differential including GN, Hydralazine induced lupus, post-renal  * HyperK -- EVERETTE, suspect unrestricted dietary K  * Anemia possibly due to renal dz    Recommendations:  Change IVFs to 0.45% with bicarb at 75cc/h  IV lasix for Kaliuresis  Follow BMP/K at 1600  Low K diet  Renal US  GN serologies including anti-histone Ab

## 2025-06-11 NOTE — PATIENT PROFILE ADULT - FALL HARM RISK - HARM RISK INTERVENTIONS

## 2025-06-11 NOTE — PATIENT PROFILE ADULT - NSPROGENSOURCEINFO_GEN_A_NUR
pt is A&Ox2-4 with confusion, unable to answer questions/lacks capacity and has no surrogate decision maker

## 2025-06-11 NOTE — PROGRESS NOTE ADULT - ASSESSMENT
84-year-old female history of high blood pressure hypothyroid sent in from [facility] for elevated potassium level.  Had lab work done yesterday that showed a creatinine of 1.9 and a potassium of 6.4.  Patient currently asymptomatic has no complaints.  Was given Lokelma today as per Dr. Ewing's prescription.   In ED bp133/79,HR 73, afbrile, RR 19, K 6.5, bun creat higher than base line, pt started on dextrose Insulin and is being admitted for further management  # Abnormal Labs- EVERETTE with CKD3, base line cr 1.8  hydration   nephro consult note likjely post hydralazine GN   Change IVFs to 0.45% with bicarb at 75cc/h  IV lasix for Kaliuresis  Follow BMP/K at 1600  Low K diet  Renal US  GN serologies including anti-histone Ab  #hyperkalemia  improving  #Dementia  supportive car  #Anemia  ch with ckd  #MDD  cont home meds  RA   pain meds as needed  #dvt pro   goals of care to be discussed   called son iglesia message left

## 2025-06-11 NOTE — PROVIDER CONTACT NOTE (OTHER) - ACTION/TREATMENT ORDERED:
Per Dr. Linder, straight cath the patient and notify Dr. Damon. Per Dr. Linder, straight cath the patient and notify Dr. Damon.  Answering service will notify Dr. Damon.

## 2025-06-11 NOTE — PROVIDER CONTACT NOTE (OTHER) - RECOMMENDATIONS
Please assess the patient, a straight cath may be needed. Please assess the patient, a straight cath may be needed.  Dr. Damon answering service notified at 10:00

## 2025-06-12 LAB
ALBUMIN SERPL ELPH-MCNC: 3.3 G/DL — SIGNIFICANT CHANGE UP (ref 3.3–5)
ALP SERPL-CCNC: 105 U/L — SIGNIFICANT CHANGE UP (ref 30–120)
ALT FLD-CCNC: 30 U/L — SIGNIFICANT CHANGE UP (ref 10–60)
ANION GAP SERPL CALC-SCNC: 8 MMOL/L — SIGNIFICANT CHANGE UP (ref 5–17)
APTT BLD: 38.7 SEC — HIGH (ref 26.1–36.8)
AST SERPL-CCNC: 28 U/L — SIGNIFICANT CHANGE UP (ref 10–40)
BILIRUB SERPL-MCNC: 0.3 MG/DL — SIGNIFICANT CHANGE UP (ref 0.2–1.2)
BUN SERPL-MCNC: 35 MG/DL — HIGH (ref 7–23)
CALCIUM SERPL-MCNC: 9.7 MG/DL — SIGNIFICANT CHANGE UP (ref 8.4–10.5)
CHLORIDE SERPL-SCNC: 107 MMOL/L — SIGNIFICANT CHANGE UP (ref 96–108)
CO2 SERPL-SCNC: 24 MMOL/L — SIGNIFICANT CHANGE UP (ref 22–31)
CREAT ?TM UR-MCNC: 14 MG/DL — SIGNIFICANT CHANGE UP
CREAT ?TM UR-MCNC: 14 MG/DL — SIGNIFICANT CHANGE UP
CREAT SERPL-MCNC: 1.61 MG/DL — HIGH (ref 0.5–1.3)
EGFR: 31 ML/MIN/1.73M2 — LOW
EGFR: 31 ML/MIN/1.73M2 — LOW
GBM IGG SER-ACNC: <0.2 AI — SIGNIFICANT CHANGE UP
GLOMERULAR BASEMENT MEMBRANE A INTERPRETATION: NEGATIVE — SIGNIFICANT CHANGE UP
GLUCOSE SERPL-MCNC: 86 MG/DL — SIGNIFICANT CHANGE UP (ref 70–99)
HCT VFR BLD CALC: 30 % — LOW (ref 34.5–45)
HGB BLD-MCNC: 9.7 G/DL — LOW (ref 11.5–15.5)
INR BLD: 0.97 RATIO — SIGNIFICANT CHANGE UP (ref 0.85–1.16)
MCHC RBC-ENTMCNC: 31.4 PG — SIGNIFICANT CHANGE UP (ref 27–34)
MCHC RBC-ENTMCNC: 32.3 G/DL — SIGNIFICANT CHANGE UP (ref 32–36)
MCV RBC AUTO: 97.1 FL — SIGNIFICANT CHANGE UP (ref 80–100)
MYELOPEROXIDASE AB SER-ACNC: <0.2 AI — SIGNIFICANT CHANGE UP
MYELOPEROXIDASE CELLS FLD QL: NEGATIVE — SIGNIFICANT CHANGE UP
NRBC BLD AUTO-RTO: 0 /100 WBCS — SIGNIFICANT CHANGE UP (ref 0–0)
OB PNL STL: NEGATIVE — SIGNIFICANT CHANGE UP
PLATELET # BLD AUTO: 118 K/UL — LOW (ref 150–400)
POTASSIUM SERPL-MCNC: 5.5 MMOL/L — HIGH (ref 3.5–5.3)
POTASSIUM SERPL-SCNC: 5.5 MMOL/L — HIGH (ref 3.5–5.3)
PROT ?TM UR-MCNC: 7 MG/DL — SIGNIFICANT CHANGE UP (ref 0–12)
PROT SERPL-MCNC: 6.8 G/DL — SIGNIFICANT CHANGE UP (ref 6–8.3)
PROT/CREAT UR-RTO: 0.5 RATIO — HIGH (ref 0–0.2)
PROTEINASE3 AB FLD-ACNC: <0.2 AI — SIGNIFICANT CHANGE UP
PROTEINASE3 AB SER-ACNC: NEGATIVE — SIGNIFICANT CHANGE UP
PROTHROM AB SERPL-ACNC: 11.3 SEC — SIGNIFICANT CHANGE UP (ref 9.9–13.4)
RBC # BLD: 3.09 M/UL — LOW (ref 3.8–5.2)
RBC # FLD: 12.8 % — SIGNIFICANT CHANGE UP (ref 10.3–14.5)
SODIUM SERPL-SCNC: 139 MMOL/L — SIGNIFICANT CHANGE UP (ref 135–145)
SODIUM UR-SCNC: 139 MMOL/L — SIGNIFICANT CHANGE UP
WBC # BLD: 5.35 K/UL — SIGNIFICANT CHANGE UP (ref 3.8–10.5)
WBC # FLD AUTO: 5.35 K/UL — SIGNIFICANT CHANGE UP (ref 3.8–10.5)

## 2025-06-12 RX ORDER — RISPERIDONE 4 MG
0.25 TABLET ORAL
Refills: 0 | Status: DISCONTINUED | OUTPATIENT
Start: 2025-06-12 | End: 2025-06-16

## 2025-06-12 RX ORDER — ALPRAZOLAM 0.5 MG
0.25 TABLET, EXTENDED RELEASE 24 HR ORAL ONCE
Refills: 0 | Status: DISCONTINUED | OUTPATIENT
Start: 2025-06-12 | End: 2025-06-12

## 2025-06-12 RX ORDER — MIRTAZAPINE 30 MG/1
7.5 TABLET, FILM COATED ORAL AT BEDTIME
Refills: 0 | Status: DISCONTINUED | OUTPATIENT
Start: 2025-06-12 | End: 2025-06-16

## 2025-06-12 RX ADMIN — Medication 75 MICROGRAM(S): at 06:29

## 2025-06-12 RX ADMIN — SODIUM CHLORIDE 75 MILLILITER(S): 9 INJECTION, SOLUTION INTRAVENOUS at 18:39

## 2025-06-12 RX ADMIN — Medication 0.25 MILLIGRAM(S): at 18:01

## 2025-06-12 RX ADMIN — Medication 0.25 MILLIGRAM(S): at 07:03

## 2025-06-12 RX ADMIN — MIRTAZAPINE 7.5 MILLIGRAM(S): 30 TABLET, FILM COATED ORAL at 21:45

## 2025-06-12 RX ADMIN — Medication 25 MILLIGRAM(S): at 14:04

## 2025-06-12 RX ADMIN — Medication 25 MILLIGRAM(S): at 21:45

## 2025-06-12 RX ADMIN — Medication 25 MILLIGRAM(S): at 06:29

## 2025-06-12 NOTE — DISCHARGE NOTE NURSING/CASE MANAGEMENT/SOCIAL WORK - FINANCIAL ASSISTANCE
[facility] provides services at a reduced cost to those who are determined to be eligible through [facility]’s financial assistance program. Information regarding [facility]’s financial assistance program can be found by going to [email or web address] or by calling [phone].

## 2025-06-12 NOTE — CARE COORDINATION ASSESSMENT. - NSPASTMEDSURGHISTORY_GEN_ALL_CORE_FT
PAST MEDICAL & SURGICAL HISTORY:  Arthritis      No significant past surgical history      RA (rheumatoid arthritis)      Major depressive disorder      HTN (hypertension)      Anemia      Dementia

## 2025-06-12 NOTE — SOCIAL WORK PROGRESS NOTE - NSSWPROGRESSNOTE_GEN_ALL_CORE
Social Work Consult received in [facility] electronic health record indicating that patient lacks capacity & has no surrogate decision maker. Per Care Coordination Assessment completed by hospital  Wendy, patient has Health Care Proxy document indicating her son Hong is her HCP, and hospital CM spoke w/ son who confirmed that he is HCP & 1st point of contact for patient's family. Social Work Consult marked complete,  to remain available for any continued needs.

## 2025-06-12 NOTE — DISCHARGE NOTE NURSING/CASE MANAGEMENT/SOCIAL WORK - NSDCCRNAME_GEN_ALL_CORE_FT
You are a resident of [facility] living facility [phone]. Utilizing [facility] [phone]

## 2025-06-12 NOTE — DISCHARGE NOTE NURSING/CASE MANAGEMENT/SOCIAL WORK - PATIENT PORTAL LINK FT
You can access the FollowMyHealth Patient Portal offered by [facility] by registering at the following website: [email or web address]. By joining [employer]’s FollowMyHealth portal, you will also be able to view your health information using other applications (apps) compatible with our system.

## 2025-06-12 NOTE — CARE COORDINATION ASSESSMENT. - PRO ARRIVE FROM
resides @ [facility] assisted living facility [phone]. Utilizing [facility] [phone]/assisted living facility

## 2025-06-12 NOTE — CAREGIVER ENGAGEMENT NOTE - CAREGIVER EDUCATION NOTES - FREE TEXT
RN/CM met with pt, noted her to be unable to participate with DC planning discussion. Pt is a resident [facility] [phone]. CM reached out to assisted living facility , [facility], who confirmed that pt is utilizing [facility] [phone]; assisted living facility MDs- DR Jana Ewing and DR Gregory Trinh; pt utilizes rolling walker independently, only requiring intermittent assistance for cueing/redirection, requiring assistance with bathing and dressing, meal-set-up. Assisted living facility CM stated that pt's Health Care Proxy is fay Du [phone] but emergency contact is sonReji [phone]. CM reached out to Health Care Proxy/son Hong, reviewed above, initiated DC planning discussion- possible need for home care services/expectations, insurance provisions, choices of agencies. Son has opted for [facility] ([facility]) [phone] - onsite home care agency-[facility] [phone]/fax [phone] if home care services indicated. SonHong stated that he is point of contact as he is Health Care Proxy but also stated that his brother, Reji be the alternate if he is not available. Pt's son stated that pt was from rehab [facility] when she transitioned to [facility] [phone] on 02/28/2024. DC pending hospital course. CM remains available and continues to follow case.  RN/CM met with pt, noted her to be unable to participate with DC planning discussion. Pt is a resident of [facility] assisted living [facility] [phone]. CM reached out to assisted living facility , [facility], who confirmed that pt is utilizing [facility] [phone]; assisted living facility MDs- DR Jana Ewing and DR Gregory Trinh; pt utilizes rolling walker independently, only requiring intermittent assistance for cueing/redirection, requiring assistance with bathing and dressing, meal-set-up. As per assisted living [facility], once pt is medically optimized for transition back to [facility], will need the following DC paperwork to be faxed over to [phone]: assisted living facility form 4081C ( filled out by MD directing aftercare back @ facility), updated PT note; any NEW/CHANGED meds to be sent over to [facility] and preferred home care agency is [facility]'s onsite agency-[facility] [phone]/fax [phone]. Assisted living facility CM stated that pt's Health Care Proxy is fay Du [phone] but emergency contact is sonReji [phone]. CM reached out to Health Care Proxy/son Hong, reviewed above, initiated DC planning discussion- possible need for home care services/expectations, insurance provisions, choices of agencies. Son has opted for [facility] assisted living facility ([facility]) [phone] - onsite home care agency-[facility] [phone]/fax [phone] if home care services indicated. SonHong stated that he is point of contact as he is Health Care Proxy but also stated that his brother, Reji be the alternate if he is not available. Pt's son stated that pt was from rehab [facility] when she transitioned to [facility] assisted living [facility] [phone] on 02/28/2024. DC pending hospital course. CM remains available and continues to follow case.

## 2025-06-12 NOTE — CARE COORDINATION ASSESSMENT. - REASON FOR CONSULT
coordination of care/decision making/discharge planning/health care directive/admitted from facility

## 2025-06-12 NOTE — PHYSICAL THERAPY INITIAL EVALUATION ADULT - ADDITIONAL COMMENTS
Pt is a poor historian, unable to provide PLOF/social history. As per EMR: pt is a resident of [facility]. PTA pt was independent with ambulation with RW, requiring occasional verbal cues and redirection. Pt requires assist for ADLs.

## 2025-06-12 NOTE — PROGRESS NOTE ADULT - ASSESSMENT
84-year-old female history of high blood pressure hypothyroid sent in from [facility] for elevated potassium level.  Had lab work done yesterday that showed a creatinine of 1.9 and a potassium of 6.4.  Patient currently asymptomatic has no complaints.  Was given Lokelma today as per Dr. Ewing's prescription.   In ED bp133/79,HR 73, afbrile, RR 19, K 6.5, bun creat higher than base line, pt started on dextrose Insulin and is being admitted for further management  # Abnormal Labs- EVERETTE with CKD3, base line cr 1.8  hydration   nephro consult note likely post hydralazine GN, has atrophic kidney   cont care as per nephro   Change IVFs to 0.45% with bicarb at 75cc/h  IV lasix for Kaliuresis  Follow BMP/K at 1600  Low K diet  Renal US  GN serologies including anti-histone Ab  #hyperkalemia  improving 5.5  #Dementia  supportive car  #Anemia  ch with ckd  #MDD  cont home meds  RA   pain meds as needed  #dvt pro   goals of care to be discussed   called fay iglesia message left

## 2025-06-12 NOTE — CARE COORDINATION ASSESSMENT. - NSCAREPROVIDERS_GEN_ALL_CORE_FT
CARE PROVIDERS:  Accepting Physician: Stormy Linder  Administration: Angi Cameron  Administration: Harrison Mayes  Administration: Alfredo Ochoa  Admitting: Stormy Linder  Attending: Stormy Linder  Case Management: Wendy Olivo  Consultant: Marty Damon  Consultant: Deyvi Malcolm  Covering Team: Joseph Toure  Covering Team: Sukumar Arellano  ED Attending: Ritesh Amador  ED Nurse: Kim Laguerre  Infection Control: Erin Garcia  Nurse: Teena Boss  Nurse: Tammy Sampson  Nurse: Nicol Estes  Override: Chiki Deluca  PCA/Nursing Assistant: Alexandrea Dewey  Respiratory Therapy: Andreina Thakur  : Patricia Zavala// Supp. Assoc.: Hossein Salcido

## 2025-06-12 NOTE — CARE COORDINATION ASSESSMENT. - SOURCES OF SUPPORT FOR PATIENT
SON/ Health Care Proxy - Hong Obriener [phone]  alternate is emergency contact: Reji [phone]/adult child(jerzy)
no

## 2025-06-12 NOTE — PHYSICAL THERAPY INITIAL EVALUATION ADULT - PERTINENT HX OF CURRENT PROBLEM, REHAB EVAL
Pt is a 83 y/o female with history of high blood pressure hypothyroid sent in from [facility] for elevated potassium level.  Had lab work done yesterday that showed a creatinine of 1.9 and a potassium of 6.4.  Patient currently asymptomatic has no complaints.  Was given Lokelma today as per Dr. Ewing's prescription. In ED bp133/79,HR 73, afbrile, RR 19, K 6.5, bun creat higher than base line, pt started on dextrose Insulin and is being admitted for further management. CXR (6/10): No acute cardiopulmonary disease process.

## 2025-06-12 NOTE — DISCHARGE NOTE NURSING/CASE MANAGEMENT/SOCIAL WORK - CASE MANAGER'S NAME
Wendy LOPEZN-RN [facility] [phone]/main number [phone]  Wendy Olivo BSN-RN CCM [phone]/main number [phone] CASE MANAGEMENT [phone]/[id number]

## 2025-06-12 NOTE — CARE COORDINATION ASSESSMENT. - CURRENT OCCUPATION, OR IF RETIRED, PREVIOUS OCCUPATION OF PATIENT
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

## 2025-06-12 NOTE — PATIENT CHOICE NOTE. - NSPTCHOICENOTES_GEN_ALL_CORE
Pt's Health Care Proxy/ son Hong Barfield [phone] has opted for [facility] assisted living facility ([facility]) [phone] - onsite home care agency-[facility] [phone]/fax [phone] if home care services indicated. CM will refer case accordingly.

## 2025-06-12 NOTE — PHARMACOTHERAPY INTERVENTION NOTE - COMMENTS
Recommended to restart home medication risperidone 0.25mg BID and mirtazapine 7.5mg daily at bedtime per facility MAR

## 2025-06-12 NOTE — DISCHARGE NOTE NURSING/CASE MANAGEMENT/SOCIAL WORK - NSDCFUADDAPPT_GEN_ALL_CORE_FT
You have an appointment to be evaluated by your assisted living physician, Dr Jana Ewing/ DR Gregory Trinh within 3-5 days of your discharge from the hospital

## 2025-06-12 NOTE — PATIENT CHOICE NOTE. - NSPTCHOICESTATE_GEN_ALL_CORE

## 2025-06-12 NOTE — PHYSICAL THERAPY INITIAL EVALUATION ADULT - GAIT DEVIATIONS NOTED, PT EVAL
decreased kami/increased time in double stance/decreased step length/decreased weight-shifting ability

## 2025-06-12 NOTE — CARE COORDINATION ASSESSMENT. - INCOME/ENTITLEMENTS
Left message to have patient call back at  in regards to scheduling revision.    social security longterm benefits

## 2025-06-12 NOTE — CARE COORDINATION ASSESSMENT. - NSDCPLANSERVICES_GEN_ALL_CORE
RN/CM met with pt, noted her to be unable to participate with DC planning discussion. Pt is a resident of [facility] assisted living [facility] [phone]. CM reached out to assisted living facility , [facility], who confirmed that pt is utilizing [facility] [phone]; assisted living facility MDs- DR Jana Ewing and DR Gregory Trinh; pt utilizes rolling walker independently, only requiring intermittent assistance for cueing/redirection, requiring assistance with bathing and dressing, meal-set-up. As per assisted living [facility], once pt is medically optimized for transition back to [facility], will need the following DC paperwork to be faxed over to [phone]: assisted living facility form 4481C ( filled out by MD directing aftercare back @ facility), updated PT note; any NEW/CHANGED meds to be sent over to [facility] and preferred home care agency is [facility]'s onsite agency-[facility] [phone]/fax [phone]. Assisted living facility CM stated that pt's Health Care Proxy is fay Du [phone] but emergency contact is sonReji [phone]. CM reached out to Health Care Proxy/son Hong, reviewed above, initiated DC planning discussion- possible need for home care services/expectations, insurance provisions, choices of agencies. Son has opted for [facility] assisted living facility ([facility]) [phone] - onsite home care agency-[facility] [phone]/fax [phone] if home care services indicated. SonHong stated that he is point of contact as he is Health Care Proxy but also stated that his brother, Reji be the alternate if he is not available. Pt's son stated that pt was from rehab [facility] when she transitioned to [facility] assisted living [facility] [phone] on 02/28/2024. DC pending hospital course. CM remains available and continues to follow case.

## 2025-06-12 NOTE — DISCHARGE NOTE NURSING/CASE MANAGEMENT/SOCIAL WORK - NSSCNAMETXT_GEN_ALL_CORE
[facility] facility ([facility]) [phone] - onsite home care agency-[facility] [phone]/fax [phone]

## 2025-06-12 NOTE — CARE COORDINATION ASSESSMENT. - FACILITY OF RESIDENCE YN
Copied from CRM #[id number]. Topic: MW Medication/Rx - MW Rx Refill  >> Dec 3, 2024 11:17 AM Inés KIRBY wrote:  EMMA called 12/03/2024 to request a medication refill and is out of medications or critically low during working hours. Medication is:  NOT listed on Med Management list or NEW medication request. Care Site to process refill: Selected 'Wrap Up CRM' and created new Refill Med Encounter after clicking 'Convert to Clinical Call'. Selected reason for call 'Refill Request'. Sent Med template and routed as high priority to appropriate clinician pool.  Wrote in the name and description of the medication and purpose of the drug.  Readback full message.  -- DO NOT REPLY / DO NOT REPLY ALL --  -- This inbox is not monitored. If this was sent to the wrong provider or department, reroute message to P ECO Reroute pool. --  -- Message is from Engagement Center Operations (ECO) --    General Patient Message: Patient states 12/03/2024 she thinks she had pink eye over the weekend and had some Gentamincin 0.3% op solution bau left from the last time she had pink eye and is almost out, and would like to know if [facility] would refill this medication today to the [facility]. Sending with high priority to call Patient back today to advise. Thank you.    Caller Information       Contact Date/Time Type Contact Phone/Fax    12/03/2024 11:16 AM CST Phone (Incoming) Emma Craig (Self) [phone] (M)            Alternative phone number: No    Can a detailed message be left? Yes - Voicemail                    [facility] assisted living facility [phone]. Utilizing [facility] [phone]/Yes

## 2025-06-12 NOTE — DISCHARGE NOTE NURSING/CASE MANAGEMENT/SOCIAL WORK - OTHER MODE OF TRANSPORTATION
You are being transported upon your discharge from the hospital via ambulance- [facility] [phone]  You are being transported upon your discharge from the hospital via ambulance- [facility] [phone]  with invoice trip auth [id number] as per [facility] transport [phone]

## 2025-06-13 LAB
% ALBUMIN: 56 % — SIGNIFICANT CHANGE UP
% ALPHA 1: 5.4 % — SIGNIFICANT CHANGE UP
% ALPHA 2: 9.4 % — SIGNIFICANT CHANGE UP
% BETA: 11.3 % — SIGNIFICANT CHANGE UP
% GAMMA: 17.9 % — SIGNIFICANT CHANGE UP
ALBUMIN SERPL ELPH-MCNC: 3 G/DL — LOW (ref 3.3–5)
ALBUMIN SERPL ELPH-MCNC: 3.7 G/DL — SIGNIFICANT CHANGE UP (ref 3.6–5.5)
ALBUMIN/GLOB SERPL ELPH: 1.3 RATIO — SIGNIFICANT CHANGE UP
ALP SERPL-CCNC: 98 U/L — SIGNIFICANT CHANGE UP (ref 30–120)
ALPHA1 GLOB SERPL ELPH-MCNC: 0.4 G/DL — SIGNIFICANT CHANGE UP (ref 0.1–0.4)
ALPHA2 GLOB SERPL ELPH-MCNC: 0.6 G/DL — SIGNIFICANT CHANGE UP (ref 0.5–1)
ALT FLD-CCNC: 27 U/L — SIGNIFICANT CHANGE UP (ref 10–60)
ANION GAP SERPL CALC-SCNC: 6 MMOL/L — SIGNIFICANT CHANGE UP (ref 5–17)
ANION GAP SERPL CALC-SCNC: 9 MMOL/L — SIGNIFICANT CHANGE UP (ref 5–17)
AST SERPL-CCNC: 27 U/L — SIGNIFICANT CHANGE UP (ref 10–40)
B-GLOBULIN SERPL ELPH-MCNC: 0.7 G/DL — SIGNIFICANT CHANGE UP (ref 0.5–1)
BASOPHILS # BLD AUTO: 0.01 K/UL — SIGNIFICANT CHANGE UP (ref 0–0.2)
BASOPHILS NFR BLD AUTO: 0.2 % — SIGNIFICANT CHANGE UP (ref 0–2)
BILIRUB SERPL-MCNC: 0.3 MG/DL — SIGNIFICANT CHANGE UP (ref 0.2–1.2)
BUN SERPL-MCNC: 28 MG/DL — HIGH (ref 7–23)
BUN SERPL-MCNC: 28 MG/DL — HIGH (ref 7–23)
CALCIUM SERPL-MCNC: 9.2 MG/DL — SIGNIFICANT CHANGE UP (ref 8.4–10.5)
CALCIUM SERPL-MCNC: 9.7 MG/DL — SIGNIFICANT CHANGE UP (ref 8.4–10.5)
CHLORIDE SERPL-SCNC: 108 MMOL/L — SIGNIFICANT CHANGE UP (ref 96–108)
CHLORIDE SERPL-SCNC: 109 MMOL/L — HIGH (ref 96–108)
CO2 SERPL-SCNC: 22 MMOL/L — SIGNIFICANT CHANGE UP (ref 22–31)
CO2 SERPL-SCNC: 25 MMOL/L — SIGNIFICANT CHANGE UP (ref 22–31)
CREAT SERPL-MCNC: 1.26 MG/DL — SIGNIFICANT CHANGE UP (ref 0.5–1.3)
CREAT SERPL-MCNC: 1.38 MG/DL — HIGH (ref 0.5–1.3)
EGFR: 38 ML/MIN/1.73M2 — LOW
EGFR: 38 ML/MIN/1.73M2 — LOW
EGFR: 42 ML/MIN/1.73M2 — LOW
EGFR: 42 ML/MIN/1.73M2 — LOW
EOSINOPHIL # BLD AUTO: 0.04 K/UL — SIGNIFICANT CHANGE UP (ref 0–0.5)
EOSINOPHIL NFR BLD AUTO: 0.9 % — SIGNIFICANT CHANGE UP (ref 0–6)
GAMMA GLOBULIN: 1.2 G/DL — SIGNIFICANT CHANGE UP (ref 0.6–1.6)
GLUCOSE SERPL-MCNC: 74 MG/DL — SIGNIFICANT CHANGE UP (ref 70–99)
GLUCOSE SERPL-MCNC: 76 MG/DL — SIGNIFICANT CHANGE UP (ref 70–99)
HCT VFR BLD CALC: 26.8 % — LOW (ref 34.5–45)
HGB BLD-MCNC: 8.6 G/DL — LOW (ref 11.5–15.5)
IMM GRANULOCYTES NFR BLD AUTO: 0.9 % — SIGNIFICANT CHANGE UP (ref 0–0.9)
INTERPRETATION SERPL IFE-IMP: SIGNIFICANT CHANGE UP
LYMPHOCYTES # BLD AUTO: 0.4 K/UL — LOW (ref 1–3.3)
LYMPHOCYTES # BLD AUTO: 9.4 % — LOW (ref 13–44)
MCHC RBC-ENTMCNC: 31.2 PG — SIGNIFICANT CHANGE UP (ref 27–34)
MCHC RBC-ENTMCNC: 32.1 G/DL — SIGNIFICANT CHANGE UP (ref 32–36)
MCV RBC AUTO: 97.1 FL — SIGNIFICANT CHANGE UP (ref 80–100)
MONOCYTES # BLD AUTO: 0.49 K/UL — SIGNIFICANT CHANGE UP (ref 0–0.9)
MONOCYTES NFR BLD AUTO: 11.5 % — SIGNIFICANT CHANGE UP (ref 2–14)
NEUTROPHILS # BLD AUTO: 3.29 K/UL — SIGNIFICANT CHANGE UP (ref 1.8–7.4)
NEUTROPHILS NFR BLD AUTO: 77.1 % — HIGH (ref 43–77)
NRBC BLD AUTO-RTO: 0 /100 WBCS — SIGNIFICANT CHANGE UP (ref 0–0)
PLATELET # BLD AUTO: 105 K/UL — LOW (ref 150–400)
POTASSIUM SERPL-MCNC: 4.8 MMOL/L — SIGNIFICANT CHANGE UP (ref 3.5–5.3)
POTASSIUM SERPL-MCNC: 4.9 MMOL/L — SIGNIFICANT CHANGE UP (ref 3.5–5.3)
POTASSIUM SERPL-SCNC: 4.8 MMOL/L — SIGNIFICANT CHANGE UP (ref 3.5–5.3)
POTASSIUM SERPL-SCNC: 4.9 MMOL/L — SIGNIFICANT CHANGE UP (ref 3.5–5.3)
PROT PATTERN SERPL ELPH-IMP: SIGNIFICANT CHANGE UP
PROT SERPL-MCNC: 5.7 G/DL — LOW (ref 6–8.3)
PROT SERPL-MCNC: 6.6 G/DL — SIGNIFICANT CHANGE UP (ref 6–8.3)
RBC # BLD: 2.76 M/UL — LOW (ref 3.8–5.2)
RBC # FLD: 12.5 % — SIGNIFICANT CHANGE UP (ref 10.3–14.5)
SODIUM SERPL-SCNC: 139 MMOL/L — SIGNIFICANT CHANGE UP (ref 135–145)
SODIUM SERPL-SCNC: 140 MMOL/L — SIGNIFICANT CHANGE UP (ref 135–145)
WBC # BLD: 4.27 K/UL — SIGNIFICANT CHANGE UP (ref 3.8–10.5)
WBC # FLD AUTO: 4.27 K/UL — SIGNIFICANT CHANGE UP (ref 3.8–10.5)

## 2025-06-13 RX ORDER — LORAZEPAM 4 MG/ML
0.5 VIAL (ML) INJECTION ONCE
Refills: 0 | Status: DISCONTINUED | OUTPATIENT
Start: 2025-06-13 | End: 2025-06-13

## 2025-06-13 RX ADMIN — Medication 75 MICROGRAM(S): at 06:31

## 2025-06-13 RX ADMIN — HEPARIN SODIUM 5000 UNIT(S): 1000 INJECTION INTRAVENOUS; SUBCUTANEOUS at 17:18

## 2025-06-13 RX ADMIN — MIRTAZAPINE 7.5 MILLIGRAM(S): 30 TABLET, FILM COATED ORAL at 20:44

## 2025-06-13 RX ADMIN — Medication 0.25 MILLIGRAM(S): at 09:51

## 2025-06-13 RX ADMIN — Medication 25 MILLIGRAM(S): at 13:33

## 2025-06-13 RX ADMIN — Medication 25 MILLIGRAM(S): at 20:44

## 2025-06-13 RX ADMIN — HEPARIN SODIUM 5000 UNIT(S): 1000 INJECTION INTRAVENOUS; SUBCUTANEOUS at 06:31

## 2025-06-13 RX ADMIN — Medication 0.5 MILLIGRAM(S): at 20:44

## 2025-06-13 RX ADMIN — Medication 25 MILLIGRAM(S): at 06:31

## 2025-06-13 RX ADMIN — Medication 0.25 MILLIGRAM(S): at 17:17

## 2025-06-13 NOTE — CASE MANAGEMENT PROGRESS NOTE - NSCMPROGRESSNOTE_GEN_ALL_CORE
Update Communication Note: AS per morning rounds,84-year-old female history of high blood pressure hypothyroid sent in from [facility] for elevated potassium level.  Had lab work done yesterday that showed a creatinine of 1.9 and a potassium of 6.4.  Patient currently asymptomatic has no complaints.  Was given Lokelma today as per Dr. Ewing's prescription. Pending DR. Linder recommendation if patient can return back to [facility] assisted living [facility] ([facility]) [phone]. Utilizes [facility] [phone] Today.  CM placed 4449C in chart to be completed by Provider in order for patient to return back to facility. CM sent referral in [facility] assisted living [facility] ([facility]) [phone] - if indicated, will coordinate and arrange home care services with facility's onsite agency-[facility] [phone]/fax [phone]. Will continue to follow patient. Update Communication Note: AS per morning rounds,84-year-old female history of high blood pressure hypothyroid sent in from [facility] for elevated potassium level.  Had lab work done yesterday that showed a creatinine of 1.9 and a potassium of 6.4.  Patient currently asymptomatic has no complaints.  Was given Lokelma today as per Dr. Ewing's prescription. Pending DR. Linder recommendation if patient can return back to [facility] assisted living [facility] ([facility]) [phone]. Utilizes [facility] [phone] Today.  CM placed 4449C in chart to be completed by Provider in order for patient to return back to facility. CM sent referral in [facility] assisted living [facility] ([facility]) [phone] - if indicated, will coordinate and arrange home care services with facility's onsite agency-[facility] [phone]/fax [phone]. Will continue to follow patient.          AS per DR. Linder plan is for Discharge to return back on Monday back to [facility] assisted living [facility] ([facility]) [phone]. Utilizes [facility] [phone]. Will continue to follow patient.

## 2025-06-13 NOTE — PROGRESS NOTE ADULT - ASSESSMENT
84-year-old female history of high blood pressure hypothyroid sent in from [facility] for elevated potassium level.  Had lab work done yesterday that showed a creatinine of 1.9 and a potassium of 6.4.  Patient currently asymptomatic has no complaints.  Was given Lokelma today as per Dr. Ewing's prescription.   In ED bp133/79,HR 73, afbrile, RR 19, K 6.5, bun creat higher than base line, pt started on dextrose Insulin and is being admitted for further management  # Abnormal Labs- EVERETTE with CKD3, base line cr 1.8  hydration   nephro consult note likely post hydralazine GN, has atrophic kidney   cont care as per nephro  off IV fluids, low K diet   #hyperkalemia  improved  #Dementia  supportive car  #Anemia  ch with ckd  #MDD  cont home meds  RA   pain meds as needed  #dvt pro   goals of care to be discussed   called son kaveh, and updated

## 2025-06-14 LAB
ALBUMIN SERPL ELPH-MCNC: 3 G/DL — LOW (ref 3.3–5)
ALP SERPL-CCNC: 97 U/L — SIGNIFICANT CHANGE UP (ref 30–120)
ALT FLD-CCNC: 26 U/L — SIGNIFICANT CHANGE UP (ref 10–60)
ANION GAP SERPL CALC-SCNC: 11 MMOL/L — SIGNIFICANT CHANGE UP (ref 5–17)
AST SERPL-CCNC: 30 U/L — SIGNIFICANT CHANGE UP (ref 10–40)
BASOPHILS # BLD AUTO: 0.01 K/UL — SIGNIFICANT CHANGE UP (ref 0–0.2)
BASOPHILS NFR BLD AUTO: 0.2 % — SIGNIFICANT CHANGE UP (ref 0–2)
BILIRUB SERPL-MCNC: 0.3 MG/DL — SIGNIFICANT CHANGE UP (ref 0.2–1.2)
BUN SERPL-MCNC: 21 MG/DL — SIGNIFICANT CHANGE UP (ref 7–23)
CALCIUM SERPL-MCNC: 9.3 MG/DL — SIGNIFICANT CHANGE UP (ref 8.4–10.5)
CHLORIDE SERPL-SCNC: 109 MMOL/L — HIGH (ref 96–108)
CO2 SERPL-SCNC: 20 MMOL/L — LOW (ref 22–31)
CREAT SERPL-MCNC: 1.22 MG/DL — SIGNIFICANT CHANGE UP (ref 0.5–1.3)
EGFR: 43 ML/MIN/1.73M2 — LOW
EGFR: 43 ML/MIN/1.73M2 — LOW
EOSINOPHIL # BLD AUTO: 0.03 K/UL — SIGNIFICANT CHANGE UP (ref 0–0.5)
EOSINOPHIL NFR BLD AUTO: 0.7 % — SIGNIFICANT CHANGE UP (ref 0–6)
GLUCOSE SERPL-MCNC: 74 MG/DL — SIGNIFICANT CHANGE UP (ref 70–99)
HCT VFR BLD CALC: 27.3 % — LOW (ref 34.5–45)
HGB BLD-MCNC: 8.5 G/DL — LOW (ref 11.5–15.5)
IMM GRANULOCYTES NFR BLD AUTO: 0.2 % — SIGNIFICANT CHANGE UP (ref 0–0.9)
LYMPHOCYTES # BLD AUTO: 0.57 K/UL — LOW (ref 1–3.3)
LYMPHOCYTES # BLD AUTO: 14.1 % — SIGNIFICANT CHANGE UP (ref 13–44)
MCHC RBC-ENTMCNC: 30.7 PG — SIGNIFICANT CHANGE UP (ref 27–34)
MCHC RBC-ENTMCNC: 31.1 G/DL — LOW (ref 32–36)
MCV RBC AUTO: 98.6 FL — SIGNIFICANT CHANGE UP (ref 80–100)
MONOCYTES # BLD AUTO: 0.53 K/UL — SIGNIFICANT CHANGE UP (ref 0–0.9)
MONOCYTES NFR BLD AUTO: 13.1 % — SIGNIFICANT CHANGE UP (ref 2–14)
NEUTROPHILS # BLD AUTO: 2.9 K/UL — SIGNIFICANT CHANGE UP (ref 1.8–7.4)
NEUTROPHILS NFR BLD AUTO: 71.7 % — SIGNIFICANT CHANGE UP (ref 43–77)
NRBC BLD AUTO-RTO: 0 /100 WBCS — SIGNIFICANT CHANGE UP (ref 0–0)
PLATELET # BLD AUTO: 100 K/UL — LOW (ref 150–400)
POTASSIUM SERPL-MCNC: 5 MMOL/L — SIGNIFICANT CHANGE UP (ref 3.5–5.3)
POTASSIUM SERPL-SCNC: 5 MMOL/L — SIGNIFICANT CHANGE UP (ref 3.5–5.3)
PROT SERPL-MCNC: 5.9 G/DL — LOW (ref 6–8.3)
RBC # BLD: 2.77 M/UL — LOW (ref 3.8–5.2)
RBC # FLD: 12.5 % — SIGNIFICANT CHANGE UP (ref 10.3–14.5)
SODIUM SERPL-SCNC: 140 MMOL/L — SIGNIFICANT CHANGE UP (ref 135–145)
WBC # BLD: 4.05 K/UL — SIGNIFICANT CHANGE UP (ref 3.8–10.5)
WBC # FLD AUTO: 4.05 K/UL — SIGNIFICANT CHANGE UP (ref 3.8–10.5)

## 2025-06-14 RX ORDER — MELATONIN 5 MG
3 TABLET ORAL AT BEDTIME
Refills: 0 | Status: DISCONTINUED | OUTPATIENT
Start: 2025-06-14 | End: 2025-06-16

## 2025-06-14 RX ADMIN — Medication 25 MILLIGRAM(S): at 23:23

## 2025-06-14 RX ADMIN — Medication 3 MILLIGRAM(S): at 23:35

## 2025-06-14 RX ADMIN — Medication 0.25 MILLIGRAM(S): at 08:10

## 2025-06-14 RX ADMIN — HEPARIN SODIUM 5000 UNIT(S): 1000 INJECTION INTRAVENOUS; SUBCUTANEOUS at 17:31

## 2025-06-14 RX ADMIN — HEPARIN SODIUM 5000 UNIT(S): 1000 INJECTION INTRAVENOUS; SUBCUTANEOUS at 05:51

## 2025-06-14 RX ADMIN — Medication 25 MILLIGRAM(S): at 13:11

## 2025-06-14 RX ADMIN — MIRTAZAPINE 7.5 MILLIGRAM(S): 30 TABLET, FILM COATED ORAL at 23:23

## 2025-06-14 RX ADMIN — Medication 75 MICROGRAM(S): at 05:51

## 2025-06-14 RX ADMIN — Medication 0.25 MILLIGRAM(S): at 17:31

## 2025-06-14 RX ADMIN — Medication 25 MILLIGRAM(S): at 05:52

## 2025-06-14 NOTE — PROGRESS NOTE ADULT - ASSESSMENT
84-year-old female history of high blood pressure hypothyroid sent in from [facility] for elevated potassium level.  Had lab work done yesterday that showed a creatinine of 1.9 and a potassium of 6.4.  Patient currently asymptomatic has no complaints.  Was given Lokelma today as per Dr. Ewing's prescription.   In ED bp133/79,HR 73, afbrile, RR 19, K 6.5, bun creat higher than base line, pt started on dextrose Insulin and is being admitted for further management  # Abnormal Labs- EVERETTE with CKD3, base line cr 1.8  hydration   nephro consult note likely post hydralazine GN, has atrophic kidney  off IV fluids, low K diet  Improved   #hyperkalemia  improved  #Dementia  supportive car  #Anemia  ch with ckd  #MDD  cont home meds  RA   pain meds as needed  #dvt pro   goals of care to be discussed   called son kaveh, and updated 84-year-old female history of high blood pressure hypothyroid sent in from [facility] for elevated potassium level.  Had lab work done yesterday that showed a creatinine of 1.9 and a potassium of 6.4.  Patient currently asymptomatic has no complaints.  Was given Lokelma today as per Dr. Ewing's prescription.   In ED bp133/79,HR 73, afbrile, RR 19, K 6.5, bun creat higher than base line, pt started on dextrose Insulin and is being admitted for further management  # Abnormal Labs- EVERETTE with CKD3, base line cr 1.8  hydration   nephro consult note likely post hydralazine GN, has atrophic kidney  off IV fluids, low K diet  Improved   #hyperkalemia  improved  #Dementia  supportive car  #Anemia  ch with ckd  #MDD  cont home meds  RA   pain meds as needed  #dvt pro   Goals of care- Hong is HCP- Pt is full code   called son hong, and updated

## 2025-06-14 NOTE — GOALS OF CARE CONVERSATION - ADVANCED CARE PLANNING - CONVERSATION DETAILS
care plan and goals of care d/w Hong who is HCP- he understands and aware that mother  wants to be resuscitated, in case need be and intubated..

## 2025-06-15 LAB
ANION GAP SERPL CALC-SCNC: 10 MMOL/L — SIGNIFICANT CHANGE UP (ref 5–17)
BUN SERPL-MCNC: 16 MG/DL — SIGNIFICANT CHANGE UP (ref 7–23)
CALCIUM SERPL-MCNC: 9.8 MG/DL — SIGNIFICANT CHANGE UP (ref 8.4–10.5)
CHLORIDE SERPL-SCNC: 109 MMOL/L — HIGH (ref 96–108)
CO2 SERPL-SCNC: 21 MMOL/L — LOW (ref 22–31)
CREAT SERPL-MCNC: 1.23 MG/DL — SIGNIFICANT CHANGE UP (ref 0.5–1.3)
EGFR: 43 ML/MIN/1.73M2 — LOW
EGFR: 43 ML/MIN/1.73M2 — LOW
GLUCOSE SERPL-MCNC: 81 MG/DL — SIGNIFICANT CHANGE UP (ref 70–99)
HCT VFR BLD CALC: 27.1 % — LOW (ref 34.5–45)
HGB BLD-MCNC: 8.7 G/DL — LOW (ref 11.5–15.5)
MCHC RBC-ENTMCNC: 31.2 PG — SIGNIFICANT CHANGE UP (ref 27–34)
MCHC RBC-ENTMCNC: 32.1 G/DL — SIGNIFICANT CHANGE UP (ref 32–36)
MCV RBC AUTO: 97.1 FL — SIGNIFICANT CHANGE UP (ref 80–100)
NRBC BLD AUTO-RTO: 0 /100 WBCS — SIGNIFICANT CHANGE UP (ref 0–0)
PLATELET # BLD AUTO: 122 K/UL — LOW (ref 150–400)
POTASSIUM SERPL-MCNC: 4.6 MMOL/L — SIGNIFICANT CHANGE UP (ref 3.5–5.3)
POTASSIUM SERPL-SCNC: 4.6 MMOL/L — SIGNIFICANT CHANGE UP (ref 3.5–5.3)
RBC # BLD: 2.79 M/UL — LOW (ref 3.8–5.2)
RBC # FLD: 12.7 % — SIGNIFICANT CHANGE UP (ref 10.3–14.5)
SODIUM SERPL-SCNC: 140 MMOL/L — SIGNIFICANT CHANGE UP (ref 135–145)
WBC # BLD: 5.95 K/UL — SIGNIFICANT CHANGE UP (ref 3.8–10.5)
WBC # FLD AUTO: 5.95 K/UL — SIGNIFICANT CHANGE UP (ref 3.8–10.5)

## 2025-06-15 RX ORDER — FUROSEMIDE 10 MG/ML
1 INJECTION INTRAMUSCULAR; INTRAVENOUS
Refills: 0 | DISCHARGE

## 2025-06-15 RX ORDER — EPOETIN ALFA 10000 [IU]/ML
10000 SOLUTION INTRAVENOUS; SUBCUTANEOUS ONCE
Refills: 0 | Status: COMPLETED | OUTPATIENT
Start: 2025-06-15 | End: 2025-06-15

## 2025-06-15 RX ORDER — MELOXICAM 15 MG/1
1 TABLET ORAL
Refills: 0 | DISCHARGE

## 2025-06-15 RX ORDER — EPOETIN ALFA 10000 [IU]/ML
10000 SOLUTION INTRAVENOUS; SUBCUTANEOUS ONCE
Refills: 0 | Status: DISCONTINUED | OUTPATIENT
Start: 2025-06-15 | End: 2025-06-15

## 2025-06-15 RX ADMIN — Medication 0.25 MILLIGRAM(S): at 17:05

## 2025-06-15 RX ADMIN — Medication 0.25 MILLIGRAM(S): at 08:07

## 2025-06-15 RX ADMIN — Medication 25 MILLIGRAM(S): at 13:22

## 2025-06-15 RX ADMIN — Medication 75 MICROGRAM(S): at 04:11

## 2025-06-15 RX ADMIN — Medication 3 MILLIGRAM(S): at 21:21

## 2025-06-15 RX ADMIN — HEPARIN SODIUM 5000 UNIT(S): 1000 INJECTION INTRAVENOUS; SUBCUTANEOUS at 05:14

## 2025-06-15 RX ADMIN — EPOETIN ALFA 10000 UNIT(S): 10000 SOLUTION INTRAVENOUS; SUBCUTANEOUS at 13:37

## 2025-06-15 RX ADMIN — MIRTAZAPINE 7.5 MILLIGRAM(S): 30 TABLET, FILM COATED ORAL at 21:21

## 2025-06-15 RX ADMIN — HEPARIN SODIUM 5000 UNIT(S): 1000 INJECTION INTRAVENOUS; SUBCUTANEOUS at 17:05

## 2025-06-15 RX ADMIN — Medication 25 MILLIGRAM(S): at 21:21

## 2025-06-15 RX ADMIN — Medication 25 MILLIGRAM(S): at 05:14

## 2025-06-15 NOTE — PROGRESS NOTE ADULT - ASSESSMENT
84-year-old female history of high blood pressure hypothyroid sent in from [facility] for elevated potassium level.  Had lab work done yesterday that showed a creatinine of 1.9 and a potassium of 6.4.  Patient currently asymptomatic has no complaints.  Was given Lokelma today as per Dr. Ewing's prescription.   In ED bp133/79,HR 73, afbrile, RR 19, K 6.5, bun creat higher than base line, pt started on dextrose Insulin and is being admitted for further management  # Abnormal Labs- EVERETTE with CKD3, base line cr 1.8  hydration   nephro consult note likely post hydralazine GN, has atrophic kidney  off IV fluids, low K diet  Improved   #hyperkalemia  improved  #Dementia  supportive car  #Anemia  ch with ckd  #MDD  cont home meds  RA   pain meds as needed  #dvt pro   Goals of care- Hong is HCP- Pt is full code  DC plan   called son hong, and updated

## 2025-06-16 VITALS
DIASTOLIC BLOOD PRESSURE: 85 MMHG | OXYGEN SATURATION: 95 % | TEMPERATURE: 98 F | RESPIRATION RATE: 18 BRPM | HEART RATE: 90 BPM | SYSTOLIC BLOOD PRESSURE: 132 MMHG

## 2025-06-16 LAB
ALBUMIN SERPL ELPH-MCNC: 3.1 G/DL — LOW (ref 3.3–5)
ALP SERPL-CCNC: 97 U/L — SIGNIFICANT CHANGE UP (ref 30–120)
ALT FLD-CCNC: 32 U/L — SIGNIFICANT CHANGE UP (ref 10–60)
ANION GAP SERPL CALC-SCNC: 15 MMOL/L — SIGNIFICANT CHANGE UP (ref 5–17)
AST SERPL-CCNC: 28 U/L — SIGNIFICANT CHANGE UP (ref 10–40)
BASOPHILS # BLD AUTO: 0.02 K/UL — SIGNIFICANT CHANGE UP (ref 0–0.2)
BASOPHILS NFR BLD AUTO: 0.3 % — SIGNIFICANT CHANGE UP (ref 0–2)
BILIRUB SERPL-MCNC: 0.4 MG/DL — SIGNIFICANT CHANGE UP (ref 0.2–1.2)
BUN SERPL-MCNC: 18 MG/DL — SIGNIFICANT CHANGE UP (ref 7–23)
CALCIUM SERPL-MCNC: 9.9 MG/DL — SIGNIFICANT CHANGE UP (ref 8.4–10.5)
CHLORIDE SERPL-SCNC: 106 MMOL/L — SIGNIFICANT CHANGE UP (ref 96–108)
CO2 SERPL-SCNC: 19 MMOL/L — LOW (ref 22–31)
CREAT SERPL-MCNC: 1.2 MG/DL — SIGNIFICANT CHANGE UP (ref 0.5–1.3)
EGFR: 44 ML/MIN/1.73M2 — LOW
EGFR: 44 ML/MIN/1.73M2 — LOW
EOSINOPHIL # BLD AUTO: 0.07 K/UL — SIGNIFICANT CHANGE UP (ref 0–0.5)
EOSINOPHIL NFR BLD AUTO: 0.9 % — SIGNIFICANT CHANGE UP (ref 0–6)
GLUCOSE SERPL-MCNC: 69 MG/DL — LOW (ref 70–99)
HCT VFR BLD CALC: 27.8 % — LOW (ref 34.5–45)
HGB BLD-MCNC: 8.9 G/DL — LOW (ref 11.5–15.5)
HISTONE AB SER-ACNC: 0.3 UNITS — SIGNIFICANT CHANGE UP (ref 0–0.9)
IMM GRANULOCYTES NFR BLD AUTO: 0.8 % — SIGNIFICANT CHANGE UP (ref 0–0.9)
LYMPHOCYTES # BLD AUTO: 0.64 K/UL — LOW (ref 1–3.3)
LYMPHOCYTES # BLD AUTO: 8.6 % — LOW (ref 13–44)
MCHC RBC-ENTMCNC: 31 PG — SIGNIFICANT CHANGE UP (ref 27–34)
MCHC RBC-ENTMCNC: 32 G/DL — SIGNIFICANT CHANGE UP (ref 32–36)
MCV RBC AUTO: 96.9 FL — SIGNIFICANT CHANGE UP (ref 80–100)
MONOCYTES # BLD AUTO: 0.6 K/UL — SIGNIFICANT CHANGE UP (ref 0–0.9)
MONOCYTES NFR BLD AUTO: 8.1 % — SIGNIFICANT CHANGE UP (ref 2–14)
NEUTROPHILS # BLD AUTO: 6.01 K/UL — SIGNIFICANT CHANGE UP (ref 1.8–7.4)
NEUTROPHILS NFR BLD AUTO: 81.3 % — HIGH (ref 43–77)
NRBC BLD AUTO-RTO: 0 /100 WBCS — SIGNIFICANT CHANGE UP (ref 0–0)
PLATELET # BLD AUTO: 135 K/UL — LOW (ref 150–400)
POTASSIUM SERPL-MCNC: 4.4 MMOL/L — SIGNIFICANT CHANGE UP (ref 3.5–5.3)
POTASSIUM SERPL-SCNC: 4.4 MMOL/L — SIGNIFICANT CHANGE UP (ref 3.5–5.3)
PROT SERPL-MCNC: 6.9 G/DL — SIGNIFICANT CHANGE UP (ref 6–8.3)
RBC # BLD: 2.87 M/UL — LOW (ref 3.8–5.2)
RBC # FLD: 12.9 % — SIGNIFICANT CHANGE UP (ref 10.3–14.5)
SODIUM SERPL-SCNC: 140 MMOL/L — SIGNIFICANT CHANGE UP (ref 135–145)
WBC # BLD: 7.4 K/UL — SIGNIFICANT CHANGE UP (ref 3.8–10.5)
WBC # FLD AUTO: 7.4 K/UL — SIGNIFICANT CHANGE UP (ref 3.8–10.5)

## 2025-06-16 PROCEDURE — 85730 THROMBOPLASTIN TIME PARTIAL: CPT

## 2025-06-16 PROCEDURE — 86334 IMMUNOFIX E-PHORESIS SERUM: CPT

## 2025-06-16 PROCEDURE — 36415 COLL VENOUS BLD VENIPUNCTURE: CPT

## 2025-06-16 PROCEDURE — 82570 ASSAY OF URINE CREATININE: CPT

## 2025-06-16 PROCEDURE — 80048 BASIC METABOLIC PNL TOTAL CA: CPT

## 2025-06-16 PROCEDURE — 85610 PROTHROMBIN TIME: CPT

## 2025-06-16 PROCEDURE — 76775 US EXAM ABDO BACK WALL LIM: CPT

## 2025-06-16 PROCEDURE — 84155 ASSAY OF PROTEIN SERUM: CPT

## 2025-06-16 PROCEDURE — 97530 THERAPEUTIC ACTIVITIES: CPT

## 2025-06-16 PROCEDURE — 84480 ASSAY TRIIODOTHYRONINE (T3): CPT

## 2025-06-16 PROCEDURE — 99285 EMERGENCY DEPT VISIT HI MDM: CPT

## 2025-06-16 PROCEDURE — 96375 TX/PRO/DX INJ NEW DRUG ADDON: CPT

## 2025-06-16 PROCEDURE — 97161 PT EVAL LOW COMPLEX 20 MIN: CPT

## 2025-06-16 PROCEDURE — 84156 ASSAY OF PROTEIN URINE: CPT

## 2025-06-16 PROCEDURE — 81001 URINALYSIS AUTO W/SCOPE: CPT

## 2025-06-16 PROCEDURE — 84165 PROTEIN E-PHORESIS SERUM: CPT

## 2025-06-16 PROCEDURE — 84300 ASSAY OF URINE SODIUM: CPT

## 2025-06-16 PROCEDURE — 84436 ASSAY OF TOTAL THYROXINE: CPT

## 2025-06-16 PROCEDURE — 71045 X-RAY EXAM CHEST 1 VIEW: CPT

## 2025-06-16 PROCEDURE — 93005 ELECTROCARDIOGRAM TRACING: CPT

## 2025-06-16 PROCEDURE — 83516 IMMUNOASSAY NONANTIBODY: CPT

## 2025-06-16 PROCEDURE — 85025 COMPLETE CBC W/AUTO DIFF WBC: CPT

## 2025-06-16 PROCEDURE — 87641 MR-STAPH DNA AMP PROBE: CPT

## 2025-06-16 PROCEDURE — 80053 COMPREHEN METABOLIC PANEL: CPT

## 2025-06-16 PROCEDURE — 82272 OCCULT BLD FECES 1-3 TESTS: CPT

## 2025-06-16 PROCEDURE — 97116 GAIT TRAINING THERAPY: CPT

## 2025-06-16 PROCEDURE — 87640 STAPH A DNA AMP PROBE: CPT

## 2025-06-16 PROCEDURE — 96374 THER/PROPH/DIAG INJ IV PUSH: CPT

## 2025-06-16 PROCEDURE — 85027 COMPLETE CBC AUTOMATED: CPT

## 2025-06-16 PROCEDURE — 84443 ASSAY THYROID STIM HORMONE: CPT

## 2025-06-16 RX ADMIN — Medication 0.25 MILLIGRAM(S): at 09:03

## 2025-06-16 RX ADMIN — Medication 25 MILLIGRAM(S): at 13:17

## 2025-06-16 RX ADMIN — Medication 75 MICROGRAM(S): at 04:17

## 2025-06-16 RX ADMIN — HEPARIN SODIUM 5000 UNIT(S): 1000 INJECTION INTRAVENOUS; SUBCUTANEOUS at 05:40

## 2025-06-16 RX ADMIN — Medication 25 MILLIGRAM(S): at 05:40

## 2025-06-16 NOTE — PROGRESS NOTE ADULT - PROVIDER SPECIALTY LIST ADULT
Nephrology
Internal Medicine
Nephrology
Internal Medicine

## 2025-06-16 NOTE — DISCHARGE NOTE PROVIDER - NSDCMRMEDTOKEN_GEN_ALL_CORE_FT
acetaminophen 500 mg oral tablet: 1 tab(s) orally 3 times a day  D3-50 1250 mcg (50,000 intl units) oral capsule: 1 cap(s) orally once a week Fridays  diclofenac 1% topical gel: Apply topically to affected area once a day bilat knees  ferrous sulfate: 325 milligram(s) orally once a day  hydrALAZINE 25 mg oral tablet: 1 tab(s) orally 3 times a day  levothyroxine 75 mcg (0.075 mg) oral tablet: 1 tab(s) orally once a day  mirtazapine 7.5 mg oral tablet: 1 tab(s) orally once a day (at bedtime)  omeprazole 20 mg oral delayed release tablet: 1 tab(s) orally once a day  RisperDAL 0.25 mg oral tablet: 1 tab(s) orally 2 times a day  Saline Mist 0.65% nasal spray: 2 spray(s) intranasally 2 times a day

## 2025-06-16 NOTE — DISCHARGE NOTE PROVIDER - CARE PROVIDER_API CALL
Marty Damon  Nephrology  [address], [address]  [place] [address]  Phone: [phone]  Fax: [phone]  Follow Up Time:

## 2025-06-16 NOTE — PROGRESS NOTE ADULT - SUBJECTIVE AND OBJECTIVE BOX
Patient is a 84y old  Female who presents with a chief complaint of abnormal labs (15 Mustapha 2025 12:08)      INTERVAL HPI/OVERNIGHT EVENTS:overnight events noted    Home Medications:  acetaminophen 500 mg oral tablet: 1 tab(s) orally 3 times a day (10 Mustapha 2025 20:30)  D3-50 1250 mcg (50,000 intl units) oral capsule: 1 cap(s) orally once a week Fridays (10 Mustapha 2025 20:30)  diclofenac 1% topical gel: Apply topically to affected area once a day bilat knees (10 Mustapha 2025 20:30)  ferrous sulfate: 325 milligram(s) orally once a day (10 Mustapha 2025 20:30)  hydrALAZINE 25 mg oral tablet: 1 tab(s) orally 3 times a day (10 Mustapha 2025 20:30)  levothyroxine 75 mcg (0.075 mg) oral tablet: 1 tab(s) orally once a day (10 Mustapha 2025 20:30)  mirtazapine 7.5 mg oral tablet: 1 tab(s) orally once a day (at bedtime) (10 Mustapha 2025 20:30)  omeprazole 20 mg oral delayed release tablet: 1 tab(s) orally once a day (10 Mustapha 2025 20:30)  RisperDAL 0.25 mg oral tablet: 1 tab(s) orally 2 times a day (10 Mustapha 2025 20:30)  Saline Mist 0.65% nasal spray: 2 spray(s) intranasally 2 times a day (10 Mustapha 2025 20:30)      MEDICATIONS  (STANDING):  heparin   Injectable 5000 Unit(s) SubCutaneous every 12 hours  hydrALAZINE 25 milliGRAM(s) Oral three times a day  levothyroxine 75 MICROGram(s) Oral daily  mirtazapine 7.5 milliGRAM(s) Oral at bedtime  risperiDONE   Tablet 0.25 milliGRAM(s) Oral <User Schedule>    MEDICATIONS  (PRN):  melatonin 3 milliGRAM(s) Oral at bedtime PRN Insomnia      Allergies    Allergy Status Unknown  No Known Allergies    Intolerances        REVIEW OF SYSTEMS:confused but has no complaints     Vital Signs Last 24 Hrs  T(C): 36.7 (16 Jun 2025 04:59), Max: 36.7 (16 Jun 2025 04:59)  T(F): 98 (16 Jun 2025 04:59), Max: 98 (16 Jun 2025 04:59)  HR: 88 (16 Jun 2025 04:59) (69 - 92)  BP: 168/87 (16 Jun 2025 04:59) (147/92 - 168/87)  BP(mean): --  RR: 18 (16 Jun 2025 04:59) (18 - 18)  SpO2: 96% (16 Jun 2025 04:59) (92% - 96%)    Parameters below as of 16 Jun 2025 04:59  Patient On (Oxygen Delivery Method): room air        PHYSICAL EXAM:  GENERAL: NAD, well-groomed, well-developed  HEAD:  Atraumatic, Normocephalic  EYES: EOMI, PERRLA, conjunctiva and sclera clear  ENMT: Moist mucous membranes,   NECK: Supple, No JVD, Normal thyroid  NERVOUS SYSTEM:  Alert non focal  CHEST/LUNG: Clear to percussion bilaterally;   HEART: Regular rate and rhythm;   ABDOMEN: Soft, Nontender, Nondistended; Bowel sounds present  EXTREMITIES:  2+ Peripheral Pulses, No clubbing, cyanosis, or edema    SKIN: No rashes or lesions    LABS:                        8.9    7.40  )-----------( 135      ( 16 Jun 2025 06:15 )             27.8     06-16    140  |  106  |  18  ----------------------------<  69[L]  4.4   |  19[L]  |  1.20    Ca    9.9      16 Jun 2025 06:15    TPro  6.9  /  Alb  3.1[L]  /  TBili  0.4  /  DBili  x   /  AST  28  /  ALT  32  /  AlkPhos  97  06-16      Urinalysis Basic - ( 16 Jun 2025 06:15 )    Color: x / Appearance: x / SG: x / pH: x  Gluc: 69 mg/dL / Ketone: x  / Bili: x / Urobili: x   Blood: x / Protein: x / Nitrite: x   Leuk Esterase: x / RBC: x / WBC x   Sq Epi: x / Non Sq Epi: x / Bacteria: x      CAPILLARY BLOOD GLUCOSE              I&O's Summary    15 Mustapha 2025 07:01  -  16 Jun 2025 07:00  --------------------------------------------------------  IN: 0 mL / OUT: 500 mL / NET: -500 mL        RADIOLOGY & ADDITIONAL TESTS:    Imaging Personally Reviewed:  [ ] YES  [ ] NO    Consultant(s) Notes Reviewed:  [ x] YES  [ ] NO    Care Discussed with Consultants/Other Providers [ ] YES  [ ] NO
Subjective: straight cathed several times since yest for PVR of over 500cc.       MEDICATIONS  (STANDING):  heparin   Injectable 5000 Unit(s) SubCutaneous every 12 hours  hydrALAZINE 25 milliGRAM(s) Oral three times a day  levothyroxine 75 MICROGram(s) Oral daily  sodium chloride 0.45% 1000 milliLiter(s) (75 mL/Hr) IV Continuous <Continuous>    MEDICATIONS  (PRN):          T(C): 36.4 (06-12-25 @ 05:27), Max: 36.6 (06-11-25 @ 13:34)  HR: 79 (06-12-25 @ 05:27) (75 - 79)  BP: 136/80 (06-12-25 @ 05:27) (136/80 - 178/79)  RR: 18 (06-12-25 @ 05:27) (14 - 18)  SpO2: 94% (06-12-25 @ 05:27) (94% - 98%)  Wt(kg): --        I&O's Detail    11 Jun 2025 07:01  -  12 Jun 2025 07:00  --------------------------------------------------------  IN:    sodium chloride 0.45% w/ Additives: 750 mL  Total IN: 750 mL    OUT:    Intermittent Catheterization - Urethral (mL): 450 mL    Voided (mL): 2100 mL  Total OUT: 2550 mL    Total NET: -1800 mL               PHYSICAL EXAM:    GENERAL: NAD  NECK: Supple, no inc in JVP  CHEST/LUNG: Clear  HEART: S1S2  ABDOMEN: Soft, Nontender, Nondistended; Bowel sounds present  EXTREMITIES:  min edema.         LABS:  CBC Full  -  ( 11 Jun 2025 05:45 )  WBC Count : 3.87 K/uL  RBC Count : 2.99 M/uL  Hemoglobin : 9.4 g/dL  Hematocrit : 29.5 %  Platelet Count - Automated : 118 K/uL  Mean Cell Volume : 98.7 fL  Mean Cell Hemoglobin : 31.4 pg  Mean Cell Hemoglobin Concentration : 31.9 g/dL  Auto Neutrophil # : x  Auto Lymphocyte # : x  Auto Monocyte # : x  Auto Eosinophil # : x  Auto Basophil # : x  Auto Neutrophil % : x  Auto Lymphocyte % : x  Auto Monocyte % : x  Auto Eosinophil % : x  Auto Basophil % : x    06-11    138  |  105  |  46[H]  ----------------------------<  97  5.5[H]   |  25  |  1.91[H]    Ca    9.4      11 Jun 2025 16:46    TPro  6.6  /  Alb  x   /  TBili  x   /  DBili  x   /  AST  x   /  ALT  x   /  AlkPhos  x   06-11    PT/INR - ( 10 Mustapha 2025 19:21 )   PT: 10.9 sec;   INR: 0.94 ratio             Impression:  * Subacute RF, NL Cr in July of 2023, Cr was 1.8 in Oct of 2024. Component of post-renal azotemia due to rec retention.  DDx: GN, Hydralazine induced lupus  * Suspect component of CKD. Atrophic L kidney  * HyperK -- EVERETTE, suspect unrestricted dietary K, urinary retention  * Anemia possibly due to renal dz    Recommendations:  Cont 0.45% with bicarb at 75cc/h to shift K  Follow repeat BMP/K this morning  IV lasix for Kaliuresis if K remains over 5.5  Low K diet  Binder PRN  Consider Fuentes if retention persists.   GN serologies including anti-histone Ab although clinical susp is low.               
Patient is a 84y old  Female who presents with a chief complaint of abnormal labs (12 Jun 2025 07:36)      INTERVAL HPI/OVERNIGHT EVENTS:  no ac event  Home Medications:  acetaminophen 500 mg oral tablet: 1 tab(s) orally 3 times a day (10 Mustapha 2025 20:30)  D3-50 1250 mcg (50,000 intl units) oral capsule: 1 cap(s) orally once a week Fridays (10 Mustapha 2025 20:30)  diclofenac 1% topical gel: Apply topically to affected area once a day bilat knees (10 Mustapha 2025 20:30)  ferrous sulfate: 325 milligram(s) orally once a day (10 Mustapha 2025 20:30)  furosemide 40 mg oral tablet: 1 tab(s) orally once a day (10 Mustapha 2025 20:30)  hydrALAZINE 25 mg oral tablet: 1 tab(s) orally 3 times a day (10 Mustapha 2025 20:30)  levothyroxine 75 mcg (0.075 mg) oral tablet: 1 tab(s) orally once a day (10 Mustapha 2025 20:30)  meloxicam 15 mg oral tablet: 1 tab(s) orally once a day (10 Mustapha 2025 20:30)  mirtazapine 7.5 mg oral tablet: 1 tab(s) orally once a day (at bedtime) (10 Mustapha 2025 20:30)  omeprazole 20 mg oral delayed release tablet: 1 tab(s) orally once a day (10 Mustapha 2025 20:30)  RisperDAL 0.25 mg oral tablet: 1 tab(s) orally 2 times a day (10 Mustapha 2025 20:30)  Saline Mist 0.65% nasal spray: 2 spray(s) intranasally 2 times a day (10 Mustapha 2025 20:30)      MEDICATIONS  (STANDING):  heparin   Injectable 5000 Unit(s) SubCutaneous every 12 hours  hydrALAZINE 25 milliGRAM(s) Oral three times a day  levothyroxine 75 MICROGram(s) Oral daily  mirtazapine 7.5 milliGRAM(s) Oral at bedtime  risperiDONE   Tablet 0.25 milliGRAM(s) Oral <User Schedule>  sodium chloride 0.45% 1000 milliLiter(s) (75 mL/Hr) IV Continuous <Continuous>    MEDICATIONS  (PRN):      Allergies    Allergy Status Unknown  No Known Allergies    Intolerances        REVIEW OF SYSTEMS:unable as confused    Vital Signs Last 24 Hrs  T(C): 36.7 (12 Jun 2025 13:22), Max: 36.7 (12 Jun 2025 09:34)  T(F): 98 (12 Jun 2025 13:22), Max: 98.1 (12 Jun 2025 09:34)  HR: 73 (12 Jun 2025 13:22) (73 - 83)  BP: 145/86 (12 Jun 2025 13:22) (114/73 - 164/89)  BP(mean): --  RR: 18 (12 Jun 2025 13:22) (16 - 18)  SpO2: 95% (12 Jun 2025 13:22) (94% - 98%)    Parameters below as of 12 Jun 2025 05:27  Patient On (Oxygen Delivery Method): room air        PHYSICAL EXAM:  GENERAL: NAD, well-groomed, well-developed  HEAD:  Atraumatic, Normocephalic  EYES: EOMI, PERRLA, conjunctiva and sclera clear  ENMT: Moist mucous membranes,   NECK: Supple, No JVD, Normal thyroid  NERVOUS SYSTEM:  Alert non focal  CHEST/LUNG: Clear to percussion bilaterally;  HEART: Regular rate and rhythm;   ABDOMEN: Soft, Nontender, Nondistended; Bowel sounds present  EXTREMITIES:  2+ Peripheral Pulses, No clubbing, cyanosis, or edema  SKIN: No rashes or lesions    LABS:                        9.7    5.35  )-----------( 118      ( 12 Jun 2025 09:00 )             30.0     06-12    139  |  107  |  35[H]  ----------------------------<  86  5.5[H]   |  24  |  1.61[H]    Ca    9.7      12 Jun 2025 09:00    TPro  6.8  /  Alb  3.3  /  TBili  0.3  /  DBili  x   /  AST  28  /  ALT  30  /  AlkPhos  105  06-12    PT/INR - ( 12 Jun 2025 08:03 )   PT: 11.3 sec;   INR: 0.97 ratio         PTT - ( 12 Jun 2025 08:03 )  PTT:38.7 sec  Urinalysis Basic - ( 12 Jun 2025 09:00 )    Color: x / Appearance: x / SG: x / pH: x  Gluc: 86 mg/dL / Ketone: x  / Bili: x / Urobili: x   Blood: x / Protein: x / Nitrite: x   Leuk Esterase: x / RBC: x / WBC x   Sq Epi: x / Non Sq Epi: x / Bacteria: x      CAPILLARY BLOOD GLUCOSE              I&O's Summary    11 Jun 2025 07:01  -  12 Jun 2025 07:00  --------------------------------------------------------  IN: 750 mL / OUT: 2950 mL / NET: -2200 mL    12 Jun 2025 07:01  -  12 Jun 2025 13:56  --------------------------------------------------------  IN: 375 mL / OUT: 0 mL / NET: 375 mL        RADIOLOGY & ADDITIONAL TESTS:< from: US Renal (06.11.25 @ 09:36) >  No hydronephrosis.    Mildly atrophic left kidney.    < end of copied text >      Imaging Personally Reviewed:  [ ] YES  [ ] NO    Consultant(s) Notes Reviewed:  [ ]x YES  [ ] NO    Care Discussed with Consultants/Other Providers [ ] YES  [ ] NO
Patient is a 84y old  Female who presents with a chief complaint of abnormal labs (13 Jun 2025 08:23)      INTERVAL HPI/OVERNIGHT EVENTS:no ac event    Home Medications:  acetaminophen 500 mg oral tablet: 1 tab(s) orally 3 times a day (10 Mustapha 2025 20:30)  D3-50 1250 mcg (50,000 intl units) oral capsule: 1 cap(s) orally once a week Fridays (10 Mustapha 2025 20:30)  diclofenac 1% topical gel: Apply topically to affected area once a day bilat knees (10 Mustapha 2025 20:30)  ferrous sulfate: 325 milligram(s) orally once a day (10 Mustapha 2025 20:30)  furosemide 40 mg oral tablet: 1 tab(s) orally once a day (10 Mustapha 2025 20:30)  hydrALAZINE 25 mg oral tablet: 1 tab(s) orally 3 times a day (10 Mustapha 2025 20:30)  levothyroxine 75 mcg (0.075 mg) oral tablet: 1 tab(s) orally once a day (10 Mustapha 2025 20:30)  meloxicam 15 mg oral tablet: 1 tab(s) orally once a day (10 Mustapha 2025 20:30)  mirtazapine 7.5 mg oral tablet: 1 tab(s) orally once a day (at bedtime) (10 Mustapha 2025 20:30)  omeprazole 20 mg oral delayed release tablet: 1 tab(s) orally once a day (10 Mustapha 2025 20:30)  RisperDAL 0.25 mg oral tablet: 1 tab(s) orally 2 times a day (10 Mustapha 2025 20:30)  Saline Mist 0.65% nasal spray: 2 spray(s) intranasally 2 times a day (10 Mustapha 2025 20:30)      MEDICATIONS  (STANDING):  heparin   Injectable 5000 Unit(s) SubCutaneous every 12 hours  hydrALAZINE 25 milliGRAM(s) Oral three times a day  levothyroxine 75 MICROGram(s) Oral daily  mirtazapine 7.5 milliGRAM(s) Oral at bedtime  risperiDONE   Tablet 0.25 milliGRAM(s) Oral <User Schedule>    MEDICATIONS  (PRN):      Allergies    Allergy Status Unknown  No Known Allergies    Intolerances        REVIEW OF SYSTEMS:denies any complaint  Vital Signs Last 24 Hrs  T(C): 36.6 (13 Jun 2025 09:55), Max: 36.7 (12 Jun 2025 13:22)  T(F): 97.8 (13 Jun 2025 09:55), Max: 98 (12 Jun 2025 13:22)  HR: 66 (13 Jun 2025 09:55) (66 - 88)  BP: 157/80 (13 Jun 2025 09:55) (144/74 - 182/82)  BP(mean): --  RR: 17 (13 Jun 2025 09:55) (17 - 18)  SpO2: 96% (13 Jun 2025 09:55) (94% - 96%)    Parameters below as of 13 Jun 2025 05:10  Patient On (Oxygen Delivery Method): room air        PHYSICAL EXAM:  GENERAL: NAD, well-groomed, well-developed  HEAD:  Atraumatic, Normocephalic  EYES: EOMI, PERRLA, conjunctiva and sclera clear  ENMT: Moist mucous membranes,   NECK: Supple, No JVD, Normal thyroid  NERVOUS SYSTEM:  Alert non focal poor memory  CHEST/LUNG: Clear to percussion bilaterally;   HEART: Regular rate and rhythm;  ABDOMEN: Soft, Nontender, Nondistended; Bowel sounds present  EXTREMITIES:  2+ Peripheral Pulses, No clubbing, cyanosis, or edema  SKIN: No rashes or lesions    LABS:                        8.6    4.27  )-----------( 105      ( 13 Jun 2025 07:45 )             26.8     06-13    139  |  108  |  28[H]  ----------------------------<  74  4.9   |  22  |  1.26    Ca    9.7      13 Jun 2025 08:51    TPro  5.7[L]  /  Alb  3.0[L]  /  TBili  0.3  /  DBili  x   /  AST  27  /  ALT  27  /  AlkPhos  98  06-13    PT/INR - ( 12 Jun 2025 08:03 )   PT: 11.3 sec;   INR: 0.97 ratio         PTT - ( 12 Jun 2025 08:03 )  PTT:38.7 sec  Urinalysis Basic - ( 13 Jun 2025 08:51 )    Color: x / Appearance: x / SG: x / pH: x  Gluc: 74 mg/dL / Ketone: x  / Bili: x / Urobili: x   Blood: x / Protein: x / Nitrite: x   Leuk Esterase: x / RBC: x / WBC x   Sq Epi: x / Non Sq Epi: x / Bacteria: x      CAPILLARY BLOOD GLUCOSE              I&O's Summary    12 Jun 2025 07:01  -  13 Jun 2025 07:00  --------------------------------------------------------  IN: 1575 mL / OUT: 1 mL / NET: 1574 mL    13 Jun 2025 07:01  -  13 Jun 2025 13:19  --------------------------------------------------------  IN: 75 mL / OUT: 400 mL / NET: -325 mL        RADIOLOGY & ADDITIONAL TESTS:    Imaging Personally Reviewed:  [x ] YES  [ ] NO    Consultant(s) Notes Reviewed:  [x ] YES  [ ] NO    Care Discussed with Consultants/Other Providers [ x] YES  [ ] NO
Subjective: somnolent. No dyspnea.       MEDICATIONS  (STANDING):  heparin   Injectable 5000 Unit(s) SubCutaneous every 12 hours  hydrALAZINE 25 milliGRAM(s) Oral three times a day  levothyroxine 75 MICROGram(s) Oral daily  mirtazapine 7.5 milliGRAM(s) Oral at bedtime  risperiDONE   Tablet 0.25 milliGRAM(s) Oral <User Schedule>  sodium chloride 0.45% 1000 milliLiter(s) (75 mL/Hr) IV Continuous <Continuous>    MEDICATIONS  (PRN):          T(C): 36.5 (06-13-25 @ 05:10), Max: 36.7 (06-12-25 @ 09:34)  HR: 84 (06-13-25 @ 05:10) (73 - 88)  BP: 164/92 (06-13-25 @ 05:10) (114/73 - 182/82)  RR: 18 (06-13-25 @ 05:10) (17 - 18)  SpO2: 94% (06-13-25 @ 05:10) (94% - 98%)  Wt(kg): --        I&O's Detail    12 Jun 2025 07:01  -  13 Jun 2025 07:00  --------------------------------------------------------  IN:    sodium chloride 0.45% w/ Additives: 1575 mL  Total IN: 1575 mL    OUT:    Voided (mL): 1 mL  Total OUT: 1 mL    Total NET: 1574 mL               PHYSICAL EXAM:    GENERAL: NAD  NECK: Supple, no inc in JVP  CHEST/LUNG: Clear  HEART: S1S2  ABDOMEN: Soft, Nontender, Nondistended; Bowel sounds present  EXTREMITIES:  trace edema.   NEURO: no asterixis      LABS:  CBC Full  -  ( 13 Jun 2025 07:45 )  WBC Count : 4.27 K/uL  RBC Count : 2.76 M/uL  Hemoglobin : 8.6 g/dL  Hematocrit : 26.8 %  Platelet Count - Automated : 105 K/uL  Mean Cell Volume : 97.1 fL  Mean Cell Hemoglobin : 31.2 pg  Mean Cell Hemoglobin Concentration : 32.1 g/dL  Auto Neutrophil # : x  Auto Lymphocyte # : x  Auto Monocyte # : x  Auto Eosinophil # : x  Auto Basophil # : x  Auto Neutrophil % : x  Auto Lymphocyte % : x  Auto Monocyte % : x  Auto Eosinophil % : x  Auto Basophil % : x    06-13    140  |  109[H]  |  28[H]  ----------------------------<  76  4.8   |  25  |  1.38[H]    Ca    9.2      13 Jun 2025 07:45    TPro  5.7[L]  /  Alb  3.0[L]  /  TBili  0.3  /  DBili  x   /  AST  27  /  ALT  27  /  AlkPhos  98  06-13    PT/INR - ( 12 Jun 2025 08:03 )   PT: 11.3 sec;   INR: 0.97 ratio           Impression:  * Subacute RF, NL Cr in July of 2023, Cr was 1.8 in Oct of 2024. Component of post-renal azotemia due to rec retention. Improved.   * Suspect component of CKD. Atrophic L kidney  * HyperK -- EVERETTE, suspect unrestricted dietary K, urinary retention. Resolved.   * Anemia possibly due to renal dz    Recommendations:  Stop IVFs.   Low K diet  Binder PRN  Consider Fuentes if retention persists.   GN serologies including anti-histone Ab although clinical susp is low.               
Patient is a 84y old  Female who presents with a chief complaint of abnormal labs (11 Jun 2025 08:09)      INTERVAL HPI/OVERNIGHT EVENTS:overnight events noted    Home Medications:  acetaminophen 500 mg oral tablet: 1 tab(s) orally 3 times a day (10 Mustapha 2025 20:30)  D3-50 1250 mcg (50,000 intl units) oral capsule: 1 cap(s) orally once a week Fridays (10 Mustapha 2025 20:30)  diclofenac 1% topical gel: Apply topically to affected area once a day bilat knees (10 Mustapha 2025 20:30)  ferrous sulfate: 325 milligram(s) orally once a day (10 Mustapha 2025 20:30)  furosemide 40 mg oral tablet: 1 tab(s) orally once a day (10 Mustapha 2025 20:30)  hydrALAZINE 25 mg oral tablet: 1 tab(s) orally 3 times a day (10 Mustapha 2025 20:30)  levothyroxine 75 mcg (0.075 mg) oral tablet: 1 tab(s) orally once a day (10 Mustapha 2025 20:30)  meloxicam 15 mg oral tablet: 1 tab(s) orally once a day (10 Mustapha 2025 20:30)  mirtazapine 7.5 mg oral tablet: 1 tab(s) orally once a day (at bedtime) (10 Mustapha 2025 20:30)  omeprazole 20 mg oral delayed release tablet: 1 tab(s) orally once a day (10 Mustapha 2025 20:30)  RisperDAL 0.25 mg oral tablet: 1 tab(s) orally 2 times a day (10 Mustapha 2025 20:30)  Saline Mist 0.65% nasal spray: 2 spray(s) intranasally 2 times a day (10 Mustapha 2025 20:30)      MEDICATIONS  (STANDING):  heparin   Injectable 5000 Unit(s) SubCutaneous every 12 hours  hydrALAZINE 25 milliGRAM(s) Oral three times a day  levothyroxine 75 MICROGram(s) Oral daily  sodium chloride 0.45% 1000 milliLiter(s) (75 mL/Hr) IV Continuous <Continuous>    MEDICATIONS  (PRN):      Allergies    Allergy Status Unknown  No Known Allergies    Intolerances        REVIEW OF SYSTEMS:  CONSTITUTIONAL: No fever, weight loss, has fatigue  EYES: No eye pain, visual disturbances, or discharge  ENMT:  No difficulty hearing, tinnitus, vertigo; No sinus or throat pain  NECK: No pain or stiffness  BREASTS: No pain, masses, or nipple discharge  RESPIRATORY: No cough, wheezing, chills or hemoptysis; No shortness of breath  CARDIOVASCULAR: No chest pain, palpitations, dizziness, or leg swelling  GASTROINTESTINAL: No abdominal or epigastric pain. No nausea, vomiting, or hematemesis; No diarrhea or constipation. No melena or hematochezia.  GENITOURINARY: No dysuria, frequency, hematuria, or incontinence  NEUROLOGICAL: No headaches, numbness, or tremors  SKIN: No itching, burning, rashes, or lesions       Vital Signs Last 24 Hrs  T(C): 36.4 (11 Jun 2025 07:46), Max: 36.6 (10 Mustapha 2025 18:54)  T(F): 97.6 (11 Jun 2025 07:46), Max: 97.8 (10 Mustapha 2025 18:54)  HR: 75 (11 Jun 2025 07:46) (73 - 77)  BP: 146/71 (11 Jun 2025 07:46) (130/65 - 164/76)  BP(mean): --  RR: 16 (11 Jun 2025 07:46) (15 - 19)  SpO2: 97% (11 Jun 2025 07:46) (96% - 98%)    Parameters below as of 11 Jun 2025 07:46  Patient On (Oxygen Delivery Method): room air        PHYSICAL EXAM:  GENERAL: NAD, well-groomed, well-developed  HEAD:  Atraumatic, Normocephalic  EYES: EOMI, PERRLA, conjunctiva and sclera clear  ENMT: Moist mucous membranes,   NECK: Supple, No JVD, Normal thyroid  NERVOUS SYSTEM:  Alert non focal  CHEST/LUNG: Clear to percussion bilaterally;   HEART: Regular rate and rhythm;  ABDOMEN: Soft, Nontender, Nondistended; Bowel sounds present  EXTREMITIES:  2+ Peripheral Pulses, No clubbing, cyanosis, or edema  SKIN: No rashes or lesions    LABS:                        9.4    3.87  )-----------( 118      ( 11 Jun 2025 05:45 )             29.5     06-11    138  |  106  |  49[H]  ----------------------------<  82  5.7[H]   |  22  |  1.90[H]    Ca    9.5      11 Jun 2025 11:46    TPro  6.7  /  Alb  3.2[L]  /  TBili  0.2  /  DBili  x   /  AST  25  /  ALT  28  /  AlkPhos  111  06-11    PT/INR - ( 10 Mustapha 2025 19:21 )   PT: 10.9 sec;   INR: 0.94 ratio         PTT - ( 10 Mustapha 2025 19:21 )  PTT:42.6 sec  Urinalysis Basic - ( 11 Jun 2025 11:46 )    Color: x / Appearance: x / SG: x / pH: x  Gluc: 82 mg/dL / Ketone: x  / Bili: x / Urobili: x   Blood: x / Protein: x / Nitrite: x   Leuk Esterase: x / RBC: x / WBC x   Sq Epi: x / Non Sq Epi: x / Bacteria: x      CAPILLARY BLOOD GLUCOSE              I&O's Summary    11 Jun 2025 07:01  -  11 Jun 2025 12:43  --------------------------------------------------------  IN: 0 mL / OUT: 1050 mL / NET: -1050 mL        RADIOLOGY & ADDITIONAL TESTS:    Imaging Personally Reviewed:  [x ] YES  [ ] NO    Consultant(s) Notes Reviewed:  [x ] YES  [ ] NO    Care Discussed with Consultants/Other Providers [x ] YES  [ ] NO
Patient is a 84y old  Female who presents with a chief complaint of abnormal labs (13 Jun 2025 13:18)      INTERVAL HPI/OVERNIGHT EVENTS:overnight events noted    Home Medications:  acetaminophen 500 mg oral tablet: 1 tab(s) orally 3 times a day (10 Mustapha 2025 20:30)  D3-50 1250 mcg (50,000 intl units) oral capsule: 1 cap(s) orally once a week Fridays (10 Mustapha 2025 20:30)  diclofenac 1% topical gel: Apply topically to affected area once a day bilat knees (10 Mustapha 2025 20:30)  ferrous sulfate: 325 milligram(s) orally once a day (10 Mustapha 2025 20:30)  furosemide 40 mg oral tablet: 1 tab(s) orally once a day (10 Mustapha 2025 20:30)  hydrALAZINE 25 mg oral tablet: 1 tab(s) orally 3 times a day (10 Mustapha 2025 20:30)  levothyroxine 75 mcg (0.075 mg) oral tablet: 1 tab(s) orally once a day (10 Mustapha 2025 20:30)  meloxicam 15 mg oral tablet: 1 tab(s) orally once a day (10 Mustapha 2025 20:30)  mirtazapine 7.5 mg oral tablet: 1 tab(s) orally once a day (at bedtime) (10 Mustapha 2025 20:30)  omeprazole 20 mg oral delayed release tablet: 1 tab(s) orally once a day (10 Mustapha 2025 20:30)  RisperDAL 0.25 mg oral tablet: 1 tab(s) orally 2 times a day (10 Mustapha 2025 20:30)  Saline Mist 0.65% nasal spray: 2 spray(s) intranasally 2 times a day (10 Mustapha 2025 20:30)      MEDICATIONS  (STANDING):  heparin   Injectable 5000 Unit(s) SubCutaneous every 12 hours  hydrALAZINE 25 milliGRAM(s) Oral three times a day  levothyroxine 75 MICROGram(s) Oral daily  mirtazapine 7.5 milliGRAM(s) Oral at bedtime  risperiDONE   Tablet 0.25 milliGRAM(s) Oral <User Schedule>    MEDICATIONS  (PRN):      Allergies    Allergy Status Unknown  No Known Allergies    Intolerances        REVIEW OF SYSTEMS:unable as confused    Vital Signs Last 24 Hrs  T(C): 36.4 (14 Jun 2025 05:56), Max: 36.5 (13 Jun 2025 13:28)  T(F): 97.5 (14 Jun 2025 05:56), Max: 97.7 (13 Jun 2025 13:28)  HR: 65 (14 Jun 2025 05:56) (65 - 93)  BP: 162/79 (14 Jun 2025 05:56) (145/90 - 162/79)  BP(mean): --  RR: 18 (14 Jun 2025 05:56) (18 - 18)  SpO2: 93% (14 Jun 2025 05:56) (93% - 96%)    Parameters below as of 14 Jun 2025 05:56  Patient On (Oxygen Delivery Method): room air        PHYSICAL EXAM:  GENERAL: frail  HEAD:  Atraumatic, Normocephalic  EYES: EOMI, PERRLA, conjunctiva and sclera clear  ENMT: Moist mucous membranes,   NECK: Supple, No JVD, Normal thyroid  NERVOUS SYSTEM:  Alert non focal  CHEST/LUNG: Clear to percussion bilaterally;   HEART: Regular rate and rhythm;   ABDOMEN: Soft, Nontender, Nondistended; Bowel sounds present  EXTREMITIES:  2+ Peripheral Pulses, No clubbing, cyanosis, or edema  SKIN: No rashes or lesions    LABS:                        8.5    4.05  )-----------( 100      ( 14 Jun 2025 06:26 )             27.3     06-14    140  |  109[H]  |  21  ----------------------------<  74  5.0   |  20[L]  |  1.22    Ca    9.3      14 Jun 2025 06:26    TPro  5.9[L]  /  Alb  3.0[L]  /  TBili  0.3  /  DBili  x   /  AST  30  /  ALT  26  /  AlkPhos  97  06-14      Urinalysis Basic - ( 14 Jun 2025 06:26 )    Color: x / Appearance: x / SG: x / pH: x  Gluc: 74 mg/dL / Ketone: x  / Bili: x / Urobili: x   Blood: x / Protein: x / Nitrite: x   Leuk Esterase: x / RBC: x / WBC x   Sq Epi: x / Non Sq Epi: x / Bacteria: x      CAPILLARY BLOOD GLUCOSE              I&O's Summary    13 Jun 2025 07:01 - 14 Jun 2025 07:00  --------------------------------------------------------  IN: 75 mL / OUT: 400 mL / NET: -325 mL        RADIOLOGY & ADDITIONAL TESTS:    Imaging Personally Reviewed:  [ ] YES  [ ] NO    Consultant(s) Notes Reviewed:  [ x] YES  [ ] NO    Care Discussed with Consultants/Other Providers [ ] YES  [ ] NO
Patient is a 84y old  Female who presents with a chief complaint of abnormal labs (14 Jun 2025 10:40)      INTERVAL HPI/OVERNIGHT EVENTS:  overnight events noted  Home Medications:  acetaminophen 500 mg oral tablet: 1 tab(s) orally 3 times a day (10 Mustapha 2025 20:30)  D3-50 1250 mcg (50,000 intl units) oral capsule: 1 cap(s) orally once a week Fridays (10 Mustapha 2025 20:30)  diclofenac 1% topical gel: Apply topically to affected area once a day bilat knees (10 Mustapha 2025 20:30)  ferrous sulfate: 325 milligram(s) orally once a day (10 Mustapha 2025 20:30)  furosemide 40 mg oral tablet: 1 tab(s) orally once a day (10 Mustapha 2025 20:30)  hydrALAZINE 25 mg oral tablet: 1 tab(s) orally 3 times a day (10 Mustapha 2025 20:30)  levothyroxine 75 mcg (0.075 mg) oral tablet: 1 tab(s) orally once a day (10 Mustapha 2025 20:30)  meloxicam 15 mg oral tablet: 1 tab(s) orally once a day (10 Mustapha 2025 20:30)  mirtazapine 7.5 mg oral tablet: 1 tab(s) orally once a day (at bedtime) (10 Mustapha 2025 20:30)  omeprazole 20 mg oral delayed release tablet: 1 tab(s) orally once a day (10 Mustapha 2025 20:30)  RisperDAL 0.25 mg oral tablet: 1 tab(s) orally 2 times a day (10 Mustapha 2025 20:30)  Saline Mist 0.65% nasal spray: 2 spray(s) intranasally 2 times a day (10 Mustapha 2025 20:30)      MEDICATIONS  (STANDING):  heparin   Injectable 5000 Unit(s) SubCutaneous every 12 hours  hydrALAZINE 25 milliGRAM(s) Oral three times a day  levothyroxine 75 MICROGram(s) Oral daily  mirtazapine 7.5 milliGRAM(s) Oral at bedtime  risperiDONE   Tablet 0.25 milliGRAM(s) Oral <User Schedule>    MEDICATIONS  (PRN):  melatonin 3 milliGRAM(s) Oral at bedtime PRN Insomnia      Allergies    Allergy Status Unknown  No Known Allergies    Intolerances        REVIEW OF SYSTEMS:poor memory denies any complaint    Vital Signs Last 24 Hrs  T(C): 36.3 (15 Mustapha 2025 04:58), Max: 36.4 (14 Jun 2025 13:00)  T(F): 97.4 (15 Mustapha 2025 04:58), Max: 97.6 (14 Jun 2025 13:00)  HR: 85 (15 Mustapha 2025 04:58) (66 - 88)  BP: 159/82 (15 Mustapha 2025 04:58) (154/82 - 167/95)  BP(mean): --  RR: 17 (15 Mustapha 2025 04:58) (17 - 18)  SpO2: 91% (15 Mustapha 2025 04:58) (91% - 94%)    Parameters below as of 15 Mustapha 2025 04:58  Patient On (Oxygen Delivery Method): room air        PHYSICAL EXAM:  GENERAL: NAD, well-groomed, well-developed  HEAD:  Atraumatic, Normocephalic  EYES: EOMI, PERRLA, conjunctiva and sclera clear  ENMT: Moist mucous membranes,   NECK: Supple, No JVD, Normal thyroid  NERVOUS SYSTEM:  Alert non focal  CHEST/LUNG: Clear to percussion bilaterally;  HEART: Regular rate and rhythm;   ABDOMEN: Soft, Nontender, Nondistended; Bowel sounds present  EXTREMITIES:  2+ Peripheral Pulses, No clubbing, cyanosis, or edema  SKIN: No rashes or lesions    LABS:                        8.5    4.05  )-----------( 100      ( 14 Jun 2025 06:26 )             27.3     06-14    140  |  109[H]  |  21  ----------------------------<  74  5.0   |  20[L]  |  1.22    Ca    9.3      14 Jun 2025 06:26    TPro  5.9[L]  /  Alb  3.0[L]  /  TBili  0.3  /  DBili  x   /  AST  30  /  ALT  26  /  AlkPhos  97  06-14      Urinalysis Basic - ( 14 Jun 2025 06:26 )    Color: x / Appearance: x / SG: x / pH: x  Gluc: 74 mg/dL / Ketone: x  / Bili: x / Urobili: x   Blood: x / Protein: x / Nitrite: x   Leuk Esterase: x / RBC: x / WBC x   Sq Epi: x / Non Sq Epi: x / Bacteria: x      CAPILLARY BLOOD GLUCOSE              I&O's Summary    14 Jun 2025 07:01  -  15 Jun 2025 07:00  --------------------------------------------------------  IN: 0 mL / OUT: 250 mL / NET: -250 mL        RADIOLOGY & ADDITIONAL TESTS:    Imaging Personally Reviewed:  [x ] YES  [ ] NO    Consultant(s) Notes Reviewed:  [ x] YES  [ ] NO    Care Discussed with Consultants/Other Providers [ x] YES  [ ] NO

## 2025-06-16 NOTE — CAREGIVER ENGAGEMENT NOTE - CAREGIVER EDUCATION - TYPES DISCUSSED
Assisted living/Discharge plan/DME/Home Care Services
Assisted living/Discharge plan/DME/Home Care Services/Transportation coordination/Transportation letter provided

## 2025-06-16 NOTE — DISCHARGE NOTE PROVIDER - HOSPITAL COURSE
84-year-old female history of high blood pressure hypothyroid sent in from [facility] for elevated potassium level.  Had lab work done yesterday that showed a creatinine of 1.9 and a potassium of 6.4.  Patient currently asymptomatic has no complaints.  Was given Lokelma today as per Dr. Ewing's prescription.   In ED bp133/79,HR 73, afbrile, RR 19, K 6.5, bun creat higher than base line, pt started on dextrose Insulin and is being admitted for further management  # Abnormal Labs- EVERETTE , acute renal failure with CKD2, base line cr 1.8  hydration   nephro consult note likely post hydralazine GN, has atrophic kidney  off IV fluids, low K diet  Improved   #hyperkalemia  improved  #Dementia  supportive car  #Anemia of ch disease  ch with ckd  #MDD  cont home meds  RA   pain meds as needed  #dvt pro   Goals of care- Hong is HCP- Pt is full code  DC plan   called son hong, and updated   84-year-old female history of high blood pressure hypothyroid sent in from [facility] for elevated potassium level.  Had lab work done yesterday that showed a creatinine of 1.9 and a potassium of 6.4.  Patient currently asymptomatic has no complaints.  Was given Lokelma today as per Dr. Ewing's prescription.   In ED bp133/79,HR 73, afbrile, RR 19, K 6.5, bun creat higher than base line, pt started on dextrose Insulin and is being admitted for further management  # Abnormal Labs- EVERETTE , acute renal failure with CKD2, base line cr 1.8  hydration   nephro consult note likely post hydralazine GN, has atrophic kidney  off IV fluids, low K diet  Improved   #hyperkalemia  improved  # Hypothyroid   levothyroxin  # HTN   hydralazine  #Dementia  supportive car  #Anemia of ch disease  ch with ckd  #MDD  cont home meds  RA   pain meds as needed  #dvt pro   Goals of care- Hong is HCP- Pt is full code  DC plan   called son hong, and updated

## 2025-06-16 NOTE — DIETITIAN INITIAL EVALUATION ADULT - ORAL INTAKE PTA/DIET HISTORY
Unable to obtain diet/weight hx from pt at this time. Pt is poor historian, lethargic, with altered mental status. Per HIE current wts are 54.4kg Oct 2024 and 59kg June 2025. per transfer documentation pt on low potassium diet PTA. NO family at bedside

## 2025-06-16 NOTE — DIETITIAN INITIAL EVALUATION ADULT - ETIOLOGY
related to kidney dysfunction related to pt tolerating therapeutic minced and moist diet, dementia at baseline

## 2025-06-16 NOTE — PROGRESS NOTE ADULT - REASON FOR ADMISSION
abnormal labs

## 2025-06-16 NOTE — PROGRESS NOTE ADULT - TIME BILLING
I have discussed care plan with patient and HCP,expressed understanding of problems treatment and their effect and side effects, alternatives in detail,I have asked if they have any questions and concerns and appropriately addressed them to best of my ability  Reviewed all diagonostic tests, lab results and drug drug interactions, and medications

## 2025-06-16 NOTE — DIETITIAN INITIAL EVALUATION ADULT - REASON FOR ADMISSION
abnormal labs    per HPI: 84-year-old female history of high blood pressure hypothyroid sent in from [facility] for elevated potassium level.  Had lab work done yesterday that showed a creatinine of 1.9 and a potassium of 6.4.  Patient currently asymptomatic has no complaints.  Was given Lokelma today as per Dr. Ewing's prescription.   In ED bp133/79,HR 73, afbrile, RR 19, K 6.5, bun creat higher than base line, pt started on dextrose Insulin and is being admitted for further management (10 Mustapha 2025 22:01)

## 2025-06-16 NOTE — DIETITIAN INITIAL EVALUATION ADULT - PERTINENT LABORATORY DATA
06-16    140  |  106  |  18  ----------------------------<  69[L]  4.4   |  19[L]  |  1.20    Ca    9.9      16 Jun 2025 06:15    TPro  6.9  /  Alb  3.1[L]  /  TBili  0.4  /  DBili  x   /  AST  28  /  ALT  32  /  AlkPhos  97  06-16

## 2025-06-16 NOTE — CAREGIVER ENGAGEMENT NOTE - CAREGIVER EDUCATION HOME CARE SERVICES - FREE TEXT
possible need for skilled nursing, physical therapy services thru [facility] assisted living facility ([facility]) [phone] - onsite home care agency-[facility] [phone]/fax [phone] 
Skilled Nursing/Physical therapy services thru [facility] assisted living facility ([facility]) [phone] - onsite home care agency-[facility] [phone]/fax [phone]

## 2025-06-16 NOTE — DISCHARGE NOTE PROVIDER - ATTENDING ATTESTATION STATEMENT
I have personally seen and examined the patient. I have collaborated with and supervised the
SRUTHI-Unknown

## 2025-06-16 NOTE — DIETITIAN INITIAL EVALUATION ADULT - OTHER INFO
Visited patient in room, pt unable to answer questions, obtained info from RN and extensive chart review. Admitted for hyperkalemia, K is now controlled. presents with good  appetite/po intake, consuming 50% of minced and moist low potassium meals. Denies n/v/d/c, last BM  6/13, noted w fecal incontinence. No reported difficulty chewing or swallowing. NKFA. Current adm weight 62.3kg, weight appears to be stable, will continue to monitor weight trends as able.    Pertinent labs/meds reviewed. Education not appropriate at this time due to disoriented/confused. Continue current diet. RD to remain available per protocol.

## 2025-06-16 NOTE — DISCHARGE NOTE PROVIDER - NSDCCPCAREPLAN_GEN_ALL_CORE_FT
PRINCIPAL DISCHARGE DIAGNOSIS  Diagnosis: Acute hyperkalemia  Assessment and Plan of Treatment: resolved      SECONDARY DISCHARGE DIAGNOSES  Diagnosis: EVERETTE (acute kidney injury)  Assessment and Plan of Treatment: resolved, out pt f up with nephro

## 2025-06-16 NOTE — CAREGIVER ENGAGEMENT NOTE - CAREGIVER EDUCATION NOTES - FREE TEXT
RN/CM noted pt's case discussed during Interdisciplinary rounds, MD has medically cleared pt for transition back to assisted living facility today 06/16/2025. Pt is aresident of [facility] [phone]. Utilizing [facility] [phone], has been utilizing a rolling walker @ assisted living facility. CM has reached out to assisted living facility , Shanae, notified her of above. As per assisted living [facility], once pt is medically optimized for transition back to [facility], will need the following DC paperwork to be faxed over to [phone]: assisted living facility form 7100C ( filled out by MD directing aftercare back @ facility), updated PT note; any NEW/CHANGED meds to be sent over to [facility] and preferred home care agency is [facility]'s onsite agency-[facility] [phone]/fax [phone]. CM faxed over above requested DC paperwork, and confirmed with assisted living facility CM Shanae that paperwork has been received, and has been forwarded to assisted living facility Wellness RN for review. CM has reached out to pt's son/ Health Care Proxy, Hong [phone], apprised him re: all above; reviewed home care services/expectations, insurance provisions, choices of agencies. Son has opted for [facility] assisted living facility ([facility]) [phone] - onsite home care agency-[facility] [phone]/fax [phone]. CM referred case accordingly, notified agency of DC today 06/16, requested start of care 06/17/2025. Pt's son has requested for CM to arrange DC transport, CM reviewed insurance provisions with son. Of note that as per staff on unit based on pt's functional level of functioning and impaired cognition, pt would benefit from ambulance transport for safe transition to assisted living facility. CM noted pt has MEDICAID, reached out to [facility] [phone], spoke to Brandi who provided this CM with ambulance DC transport invoice auth # [id number] to utilize for ambulance pick-up today 06/16/2025 from [facility] for return to assisted living facility [facility] @ [place]- requested ambulance transport with [facility] [phone] with above transport auth provided from [facility] transport [phone] for 2 PM pick-up today from [facility]- staff on unit, assisted living facility staff and son made aware. IMM reviewed with sonHong.  RN/CM noted pt's case discussed during Interdisciplinary rounds, MD has medically cleared pt for transition back to assisted living facility today 06/16/2025. Pt is aresident of [facility] [phone]. Utilizing [facility] [phone], has been utilizing a rolling walker @ assisted living facility. CM has reached out to [facility] , Shanae, notified her of above. As per [facility], once pt is medically optimized for transition back to [facility], will need the following DC paperwork to be faxed over to [phone]: assisted living facility form 4449C ( filled out by MD directing aftercare back @ facility), updated PT note; any NEW/CHANGED meds to be sent over to [facility] and preferred home care agency is [facility]'s onsite agency-[facility] [phone]/fax [phone]. CM faxed over above requested DC paperwork, and confirmed with [facility] CM Shanae that paperwork has been received, and has been forwarded to assisted [facility] facility Wellness RN for review. Later, this CM received response from [facility] , Shanae that pt is being accepted back today. CM has reached out to pt's son/ Health Care Proxy, Hong [phone], apprised him re: all above; reviewed home care services/expectations, insurance provisions, choices of agencies. Son has opted for [facility] living [facility] ([facility]) [phone] - onsite home care agency-[facility] [phone]/fax [phone]. CM referred case accordingly, notified agency of DC today 06/16, requested start of care 06/17/2025. Pt's son has requested for CM to arrange DC transport, CM reviewed insurance provisions with son. Of note that as per staff on unit based on pt's functional level of functioning and impaired cognition, pt would benefit from ambulance transport for safe transition to assisted living facility. CM noted pt has MEDICAID, reached out to [facility] transport [phone], spoke to Brandi who provided this CM with ambulance DC transport invoice auth # [id number] to utilize for ambulance pick-up today 06/16/2025 from [facility] for return to assisted living facility [facility] [place]- requested ambulance transport with [facility] [phone] with above transport auth provided from [facility] transport [phone] for 2 PM pick-up today from [facility]- staff on unit, assisted living facility staff and son made aware. IMM reviewed with Hnog aponte.

## 2025-06-16 NOTE — DIETITIAN INITIAL EVALUATION ADULT - ADD RECOMMEND
1) Continue current diet w renal restrictions  2) Continue to monitor PO intake, tolerance to diet prescription, weights, labs, GI tolerance, and skin integrity. MD notified via teams regarding any change/changes to diet order.

## 2025-06-16 NOTE — DIETITIAN INITIAL EVALUATION ADULT - NS FNS DIET ORDER
Diet, Renal Restrictions:   For patients receiving Renal Replacement - No Protein Restr, No Conc K, No Conc Phos, Low Sodium  Minced and Moist (MINCEDMOIST) (06-10-25 @ 22:15) [Active]